# Patient Record
Sex: FEMALE | Race: BLACK OR AFRICAN AMERICAN | URBAN - METROPOLITAN AREA
[De-identification: names, ages, dates, MRNs, and addresses within clinical notes are randomized per-mention and may not be internally consistent; named-entity substitution may affect disease eponyms.]

---

## 2020-09-29 ENCOUNTER — OFFICE VISIT (OUTPATIENT)
Dept: OBGYN CLINIC | Age: 20
End: 2020-09-29
Payer: COMMERCIAL

## 2020-09-29 ENCOUNTER — HOSPITAL ENCOUNTER (OUTPATIENT)
Age: 20
Setting detail: SPECIMEN
Discharge: HOME | End: 2020-09-29
Payer: COMMERCIAL

## 2020-09-29 VITALS
HEART RATE: 92 BPM | BODY MASS INDEX: 43.94 KG/M2 | WEIGHT: 248 LBS | HEIGHT: 63 IN | SYSTOLIC BLOOD PRESSURE: 129 MMHG | TEMPERATURE: 98.1 F | DIASTOLIC BLOOD PRESSURE: 90 MMHG

## 2020-09-29 PROCEDURE — 99385 PREV VISIT NEW AGE 18-39: CPT | Performed by: ADVANCED PRACTICE MIDWIFE

## 2020-09-29 SDOH — ECONOMIC STABILITY: FOOD INSECURITY: WITHIN THE PAST 12 MONTHS, YOU WORRIED THAT YOUR FOOD WOULD RUN OUT BEFORE YOU GOT MONEY TO BUY MORE.: NEVER TRUE

## 2020-09-29 SDOH — ECONOMIC STABILITY: INCOME INSECURITY: HOW HARD IS IT FOR YOU TO PAY FOR THE VERY BASICS LIKE FOOD, HOUSING, MEDICAL CARE, AND HEATING?: SOMEWHAT HARD

## 2020-09-29 SDOH — ECONOMIC STABILITY: TRANSPORTATION INSECURITY
IN THE PAST 12 MONTHS, HAS LACK OF TRANSPORTATION KEPT YOU FROM MEETINGS, WORK, OR FROM GETTING THINGS NEEDED FOR DAILY LIVING?: NO

## 2020-09-29 SDOH — ECONOMIC STABILITY: FOOD INSECURITY: WITHIN THE PAST 12 MONTHS, THE FOOD YOU BOUGHT JUST DIDN'T LAST AND YOU DIDN'T HAVE MONEY TO GET MORE.: SOMETIMES TRUE

## 2020-09-29 SDOH — ECONOMIC STABILITY: TRANSPORTATION INSECURITY
IN THE PAST 12 MONTHS, HAS THE LACK OF TRANSPORTATION KEPT YOU FROM MEDICAL APPOINTMENTS OR FROM GETTING MEDICATIONS?: NO

## 2020-09-29 ASSESSMENT — ENCOUNTER SYMPTOMS
ALLERGIC/IMMUNOLOGIC NEGATIVE: 1
EYES NEGATIVE: 1
GASTROINTESTINAL NEGATIVE: 1
RESPIRATORY NEGATIVE: 1

## 2020-09-29 NOTE — PROGRESS NOTES
Date of Visit:  2020  Patient :  2000     Subjective:     CHIEF COMPLAINT:     Chief Complaint   Patient presents with    Annual Exam     DUE FOR PAP       HPI  New patient. Here for annual visit. Her bowel habits are regular. She denies any bloating. She denies dysuria. She denies urinary leaking. Some itching and a little odor over the last few weeks. [facility] She denies vaginal discharge. She is sexually active with single partner, contraception - OCP (estrogen/progesterone). 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 Desires STD screening. Is on COCPs for last 3 years would like to continue. States she has refills. Depression  2 question Screen:  Over the past two weeks, has the patient felt down,depressed or hopeless? No  Over the past two weeks, has the patient felt little interest or pleasure in doing things? No    PREVENTIVE HEALTH SCREENING:   Date of last pap: n/a                 HPV typing/date :n/a    Abnormal pap smear history: no    Date of last mammogram:  neg   Date of last DEXA scan: n/a  Date of last colonoscopy: n/a    History of Gestational Diabetes: No    Preventive screening: No    Family history of Breast, Ovarian, Colon or Uterine Cancer:  neg    Objective:   GYNECOLOGIC HISTORY:     LMP:  Patient's last menstrual period was 2020 (exact date).   Monthly menses (days): 28  Length: 5  Flow: moderate, light    Menopause: n/a  Hormone Replacement: no    Sexually active: Yes  HPV vaccine: unsure    STD history: No    Birth control method :Yes  Permanent Sterilization: No    SOCIALHISTORY:  Seat Belt Use: Yes  Domestic Violence: No    Counseling: No  Regular exercise: No   Counseling:No     Diet discussed: Yes    Social History     Tobacco Use   Smoking Status Never Smoker   Smokeless Tobacco Never Used     Social effort. Cardiovascular: The heart was in a regular rhythm and rate was normal.  No murmur or extra sounds were noted. Breast:  The breasts are normal size and symmetrical.  There are no skin changes with position changes. The nipples are without deviations or discharge. No masses were palpated. No axillary or supraclavicular lymphadenopathy is present. Back:  Straight with no CVA tenderness present    Abdomen: The abdomen is soft and non-tender. There was no guarding, rebound or rigidity. The bladder was without fullness or tenderness. No hernias were appreciated. Pelvic: The external genitalia has a normal appearance without masses or lesions. There is no inguinal lymphadenopathy. Bladder/urethra without discharge or mass. Normal urethra. The vagina is pink and well rugated. The cervix is without lesions or discharge and with no CMT. + friable cervix. Pap was obtained without difficulty. The uterus is midline, mobile, normal size/shape and non-tender. The adnexa are without masses or tenderness. Rectum is normal.    Musculoskeletal: Normal gait. No contractions with normal movement of all extremities. No cyanosis or edema present    Psychiatric:  Alert, oriented to time, place, person and situation. There are no mood or affect changes. ASSESSMENT/PLAN:     Routine annual gynecological exam  .1. Women's annual routine gynecological examination  - PAP SMEAR; Future  -Discussed contraception. Is on COCPs can call office for refills. 2. Vaginal itching  - VAGINITIS DNA PROBE; Future  -Discussed boric acid twice a week for 3 months for suppression    3. STD exposure  - C.trachomatis N.gonorrhoeae DNA, Thin Prep; Future  - T. Pallidum Ab; Future  - HIV Screen; Future        Counseling Completed:    discussed need for repeat pap as per American Society for Colposcopy and Cervical Pathology guidelines.   discussed need for mammograms every 1 year, If >44 yo and last mammogram was negative. discussed birth control and barrier recommendations. discussed STD counseling and prevention. discussed Gardisil counseling for all patients 9-25 yo, pt to find out if she received. Hereditary Breast, Ovarian, Colon and Uterine Cancer screening discussed. Tobacco & Secondary smoke risks discussed; with recommendation for cessation and avoidance. Routine health maintenance per patients PCP discussed. Reviewed how to find PCP        Patient was seen with total face to face time of 25 minutes. More than 50% of this visit was counseling and education regarding    Diagnosis Orders   1. Women's annual routine gynecological examination  PAP SMEAR   2. Vaginal itching  VAGINITIS DNA PROBE   3. STD exposure  C.trachomatis N.gonorrhoeae DNA, Thin Prep    T.  Pallidum Ab    HIV Screen       Electronically signed by Adriana Zurita on 9/29/20 at 2:24 PM EDT

## 2020-09-30 LAB
DIRECT EXAM: ABNORMAL
Lab: ABNORMAL
SPECIMEN DESCRIPTION: ABNORMAL

## 2020-10-01 ENCOUNTER — TELEPHONE (OUTPATIENT)
Dept: OBGYN CLINIC | Age: 20
End: 2020-10-01

## 2020-10-01 LAB
CHLAMYDIA BY THIN PREP: NEGATIVE
N. GONORRHOEAE DNA, THIN PREP: NEGATIVE
SPECIMEN DESCRIPTION: NORMAL

## 2020-10-01 RX ORDER — CLINDAMYCIN HYDROCHLORIDE 300 MG/1
300 CAPSULE ORAL 2 TIMES DAILY
Qty: 14 CAPSULE | Refills: 0 | Status: SHIPPED | OUTPATIENT
Start: 2020-10-01 | End: 2020-10-08

## 2020-10-01 NOTE — TELEPHONE ENCOUNTER
Patient notified of results. Asked about other labs that are in the process. I let her know that we would call her when they were completed.

## 2020-10-08 LAB — CYTOLOGY REPORT: NORMAL

## 2020-10-16 LAB
HPV SOURCE: NORMAL
HPV, GENOTYPE 16: NOT DETECTED
HPV, GENOTYPE 18: NOT DETECTED
HPV, HIGH RISK OTHER: DETECTED

## 2020-10-19 ENCOUNTER — TELEPHONE (OUTPATIENT)
Dept: OBGYN CLINIC | Age: 20
End: 2020-10-19

## 2020-10-19 PROBLEM — R87.610 ATYPICAL SQUAMOUS CELLS OF UNDETERMINED SIGNIFICANCE ON CYTOLOGIC SMEAR OF CERVIX (ASC-US): Status: ACTIVE | Noted: 2020-10-19

## 2020-10-19 NOTE — TELEPHONE ENCOUNTER
Called to discuss pap results. Message was left for patient to call office. +ASCUS and +HPV  Recommended repeat pap with cotesting for HPV in 2021.  Will attempt again to contact patient

## 2020-10-20 ENCOUNTER — TELEPHONE (OUTPATIENT)
Dept: PEDIATRICS CLINIC | Age: 20
End: 2020-10-20

## 2020-10-22 ENCOUNTER — TELEPHONE (OUTPATIENT)
Dept: OBGYN CLINIC | Age: 20
End: 2020-10-22

## 2020-10-22 NOTE — TELEPHONE ENCOUNTER
Spoke w/ pt regarding completing outstanding blood work. States she will try to get in next Tuesday and complete.

## 2021-02-05 ENCOUNTER — OFFICE VISIT (OUTPATIENT)
Dept: OBGYN CLINIC | Age: 21
End: 2021-02-05
Payer: COMMERCIAL

## 2021-02-05 ENCOUNTER — HOSPITAL ENCOUNTER (OUTPATIENT)
Dept: ULTRASOUND IMAGING | Facility: CLINIC | Age: 21
Discharge: HOME | End: 2021-02-07
Payer: COMMERCIAL

## 2021-02-05 ENCOUNTER — PATIENT MESSAGE (OUTPATIENT)
Dept: OBGYN CLINIC | Age: 21
End: 2021-02-05

## 2021-02-05 VITALS
HEIGHT: 65 IN | HEART RATE: 101 BPM | DIASTOLIC BLOOD PRESSURE: 87 MMHG | BODY MASS INDEX: 43.4 KG/M2 | TEMPERATURE: 97.3 F | SYSTOLIC BLOOD PRESSURE: 136 MMHG | WEIGHT: 260.5 LBS

## 2021-02-05 DIAGNOSIS — N93.9 ABNORMAL UTERINE BLEEDING (AUB): Primary | ICD-10-CM

## 2021-02-05 DIAGNOSIS — N76.0 BV (BACTERIAL VAGINOSIS): Primary | ICD-10-CM

## 2021-02-05 DIAGNOSIS — N93.9 ABNORMAL UTERINE BLEEDING (AUB): ICD-10-CM

## 2021-02-05 DIAGNOSIS — B96.89 BV (BACTERIAL VAGINOSIS): Primary | ICD-10-CM

## 2021-02-05 PROCEDURE — G8427 DOCREV CUR MEDS BY ELIG CLIN: HCPCS | Performed by: ADVANCED PRACTICE MIDWIFE

## 2021-02-05 PROCEDURE — G8417 CALC BMI ABV UP PARAM F/U: HCPCS | Performed by: ADVANCED PRACTICE MIDWIFE

## 2021-02-05 PROCEDURE — 99213 OFFICE O/P EST LOW 20 MIN: CPT | Performed by: ADVANCED PRACTICE MIDWIFE

## 2021-02-05 PROCEDURE — G8484 FLU IMMUNIZE NO ADMIN: HCPCS | Performed by: ADVANCED PRACTICE MIDWIFE

## 2021-02-05 PROCEDURE — 1036F TOBACCO NON-USER: CPT | Performed by: ADVANCED PRACTICE MIDWIFE

## 2021-02-05 PROCEDURE — 76856 US EXAM PELVIC COMPLETE: CPT

## 2021-02-05 ASSESSMENT — PATIENT HEALTH QUESTIONNAIRE - PHQ9
SUM OF ALL RESPONSES TO PHQ9 QUESTIONS 1 & 2: 0
SUM OF ALL RESPONSES TO PHQ QUESTIONS 1-9: 0
SUM OF ALL RESPONSES TO PHQ QUESTIONS 1-9: 0

## 2021-02-05 ASSESSMENT — ENCOUNTER SYMPTOMS
ALLERGIC/IMMUNOLOGIC NEGATIVE: 1
RESPIRATORY NEGATIVE: 1
GASTROINTESTINAL NEGATIVE: 1

## 2021-02-05 NOTE — PROGRESS NOTES
SUBJECTIVE:    Chief Complaint:  Chief Complaint   Patient presents with    Vaginal Bleeding     had normal period 1/13-1/18 and has been bleeding since about 1/28       CHIEF COMPLAINT:     Chief Complaint   Patient presents with    Vaginal Bleeding     had normal period 1/13-1/18 and has been bleeding since about 1/28       HPI:   Gina Garvin presents today complaining of AUB. She reports she has been consistently on same birth control pills for several years without issues. This issue started this month after her withdrawal bleed. She then started bleeding again on the 28 and was pretty heavy. She has no other associated symptoms. She has done pregnancy tests, which have been negative. States low probability of pregnancy. She states she has not started any new medications, herbals, etc.  She has not experienced any changes to her health either. Gina Garvin also reports she has not missed/skipped any pills    GYNECOLOGIC HISTORY:   Patient's last menstrual period was 01/13/2021 (exact date). Last pap: 2020 [facility]    Sexually active: Yes     STD history: No    Birth control method : OCP      REVIEW OF SYSTEMS:  Review of Systems   Constitutional: Negative. HENT: Negative. Respiratory: Negative. Cardiovascular: Negative. Gastrointestinal: Negative. Genitourinary: Positive for menstrual problem. Musculoskeletal: Negative. Skin: Negative. Allergic/Immunologic: Negative. Psychiatric/Behavioral: Negative. PHYSICAL EXAM:  Constitutional:   Blood pressure 136/87, pulse 101, temperature 97.3 °F (36.3 °C), height 5' 5\" (1.651 m), weight 260 lb 8 oz (118.2 kg), last menstrual period 01/13/2021. Wt Readings from Last 3 Encounters:   02/05/21 260 lb 8 oz (118.2 kg)   09/29/20 248 lb (112.5 kg)   06/28/19 227 lb (103 kg)     General Appearance:   This is a well-developed, well-nourishedand well-groomed female    Skin:  Inspection of the skin revealed no rashes or lesions    Pelvic:  Deferred Psychiatric:  Alert, oriented to time, place, person and situation. There are no mood or affect changes. ASSESSMENT/PLAN:  1. Abnormal uterine bleeding (AUB)  - Discussed etiologies of AUB/BTB on OCP. Discussed labs in relation to pill-free week but unsure what they would contribute to diagnosis. She has no other associated symptoms. Labs declined  - Discussed US for possible cyst but may also not be visible  - Discussed continue to take her OCP per her normal, expect a bleed next week and start new pack, may self-resolve afterwards  - Discussed changing and/or increasing hormonal component of OPC if persistent bleeding  - Discussed weight gain of 12 lb since September and increase in Estrogen and effect on hormone levels; Is attempting to diet and exercise. - Reviewed needed to return for pap follow-up in September  - US NON OB TRANSVAGINAL; Future  - US PELVIS COMPLETE; Future      The patient, Rosanne Rivas,  was seen for the visit on this date of service by the [facility]  The time component, involved both face-to-face (counseling and education)  and non face-to-face time (care coordination), spent in determining the total time component.

## 2021-02-26 ENCOUNTER — OFFICE VISIT (OUTPATIENT)
Dept: OBGYN CLINIC | Age: 21
End: 2021-02-26
Payer: COMMERCIAL

## 2021-02-26 ENCOUNTER — HOSPITAL ENCOUNTER (OUTPATIENT)
Age: 21
Setting detail: SPECIMEN
Discharge: HOME | End: 2021-02-26
Payer: COMMERCIAL

## 2021-02-26 VITALS
BODY MASS INDEX: 41.74 KG/M2 | HEIGHT: 66 IN | DIASTOLIC BLOOD PRESSURE: 82 MMHG | TEMPERATURE: 97.1 F | WEIGHT: 259.7 LBS | SYSTOLIC BLOOD PRESSURE: 127 MMHG | HEART RATE: 97 BPM

## 2021-02-26 DIAGNOSIS — Z11.3 SCREENING FOR STD (SEXUALLY TRANSMITTED DISEASE): ICD-10-CM

## 2021-02-26 DIAGNOSIS — N89.8 VAGINAL ITCHING: Primary | ICD-10-CM

## 2021-02-26 DIAGNOSIS — N89.8 VAGINAL ITCHING: ICD-10-CM

## 2021-02-26 PROCEDURE — G8417 CALC BMI ABV UP PARAM F/U: HCPCS | Performed by: ADVANCED PRACTICE MIDWIFE

## 2021-02-26 PROCEDURE — 36415 COLL VENOUS BLD VENIPUNCTURE: CPT | Performed by: ADVANCED PRACTICE MIDWIFE

## 2021-02-26 PROCEDURE — G8427 DOCREV CUR MEDS BY ELIG CLIN: HCPCS | Performed by: ADVANCED PRACTICE MIDWIFE

## 2021-02-26 PROCEDURE — G8484 FLU IMMUNIZE NO ADMIN: HCPCS | Performed by: ADVANCED PRACTICE MIDWIFE

## 2021-02-26 PROCEDURE — 99213 OFFICE O/P EST LOW 20 MIN: CPT | Performed by: ADVANCED PRACTICE MIDWIFE

## 2021-02-26 PROCEDURE — 1036F TOBACCO NON-USER: CPT | Performed by: ADVANCED PRACTICE MIDWIFE

## 2021-02-26 RX ORDER — FLUCONAZOLE 150 MG/1
150 TABLET ORAL ONCE
Qty: 1 TABLET | Refills: 0 | Status: SHIPPED | OUTPATIENT
Start: 2021-02-26 | End: 2021-02-26

## 2021-02-26 ASSESSMENT — ENCOUNTER SYMPTOMS
GASTROINTESTINAL NEGATIVE: 1
RESPIRATORY NEGATIVE: 1

## 2021-02-26 NOTE — PROGRESS NOTES
Performed venipuncture of the _right_ arm. Pt tolerated _well_. Blood Flow obtained obtained succussfully___.   Collected 2_ -SST _ -Lav

## 2021-02-26 NOTE — PROGRESS NOTES
CHIEF COMPLAINT:     Chief Complaint   Patient presents with    Medication Refill    Vaginal Itching       HPI:   Tam Kim presents today complaining of vaginal itching. She states she has no vaginal discharge, but notices a slight odor. She reports this started approximately a week ago and started once her period ended. She has tried boric acid vaginal suppositories and cleaning with sensitive, unscented Dove soap which has not helped. Jason Jeronimo feels that nothing she has tried is making it better. She is frustrated with recurrent BV and having to deal with it      GYNECOLOGIC HISTORY:     Menopause: NA  Patient's last menstrual period was 01/28/2021 (exact date). Last pap: 9/2020  ASCUS    Sexually active: Yes  New sexual partner: No    STD history: No    Birth control method :Yes      REVIEW OF SYSTEMS:  Review of Systems   Constitutional: Negative. Respiratory: Negative. Cardiovascular: Negative. Gastrointestinal: Negative. Genitourinary:        Vaginal itching   Psychiatric/Behavioral: Negative. PHYSICAL EXAM:  Constitutional:   Blood pressure 127/82, pulse 97, temperature 97.1 °F (36.2 °C), height 5' 6\" (1.676 m), weight 259 lb 11.2 oz (117.8 kg), last menstrual period 01/28/2021, not currently breastfeeding. Wt Readings from Last 3 Encounters:   02/26/21 259 lb 11.2 oz (117.8 kg)   02/05/21 260 lb 8 oz (118.2 kg)   09/29/20 248 lb (112.5 kg)         General Appearance: This is a well-developed, well-nourishedand well-groomed female    Breast:  Deferred     Pelvic:  Deferred    Psychiatric:  Alert, oriented to time, place, person and situation. There are no mood or affect changes. ASSESSMENT/PLAN:  1. Vaginal itching  - Vaginitis DNA Probe; Future   Discussed with patient cleansing her vagina with water and no soaps. Importance of keeping bottom dry and wearing cotton underwear.    Will treat with Clindamycin and encouraged probiotic use Will send in Diflucan to treat over the weekend possible yeast.  Will send in 6 month prescription for prophylactic treatment of her recurrent BV. 2. Screening for STD    - Vaginitis DNA Probe; Future  - C.trachomatis N.gonorrhoeae DNA, Urine; Future  - Hepatitis A Antibody, Total; Future  - Hepatitis B Surface Antigen; Future  - Hepatitis C Antibody; Future  - T. pallidum Ab; Future  - HIV Screen; Future  - [place] COLLECTION VENOUS BLOOD,VENIPUNCTURE      The patient, Rosanne Rivas,  was seen with a total time spent of 20 minutes for the visit on this date of service by the [facility]  The time component, involved both face-to-face (counseling and education)  and non face-to-face time (care coordination), spent in determining the total time component.

## 2021-02-27 LAB
DIRECT EXAM: ABNORMAL
HAV AB SERPL IA-ACNC: NONREACTIVE
HEPATITIS B SURFACE ANTIGEN: NONREACTIVE
HEPATITIS C ANTIBODY: NONREACTIVE
HIV AG/AB: NONREACTIVE
Lab: ABNORMAL
SPECIMEN DESCRIPTION: ABNORMAL
T. PALLIDUM, IGG: NONREACTIVE

## 2021-03-01 LAB
C. TRACHOMATIS DNA ,URINE: NEGATIVE
N. GONORRHOEAE DNA, URINE: NEGATIVE
SPECIMEN DESCRIPTION: NORMAL

## 2021-03-01 NOTE — TELEPHONE ENCOUNTER
From: ZURI Minor CNM  To: Lucho Tillman  Sent: 2/5/2021 1:59 PM EST  Subject: ultrasound    Osman Huggins,  Just reviewed your ultrasound. Everything looks good  The good news is there is no cyst, the bad news, this isn't helping us (you) with the bleeding. Lets see how it goes with your next package of pills.   Let me know if the bleeding is not going away and at that point, we may want to revisit another birth control pill as we discussed  Any questions, let me know,  connie

## 2021-03-08 ENCOUNTER — TELEPHONE (OUTPATIENT)
Dept: OBGYN CLINIC | Age: 21
End: 2021-03-08

## 2021-03-08 RX ORDER — NORGESTIMATE AND ETHINYL ESTRADIOL 7DAYSX3 28
1 KIT ORAL DAILY
Qty: 28 TABLET | Refills: 11 | Status: SHIPPED | OUTPATIENT
Start: 2021-03-08 | End: 2021-05-24 | Stop reason: ALTCHOICE

## 2021-04-13 ENCOUNTER — OFFICE VISIT (OUTPATIENT)
Dept: OBGYN CLINIC | Age: 21
End: 2021-04-13
Payer: COMMERCIAL

## 2021-04-13 ENCOUNTER — PATIENT MESSAGE (OUTPATIENT)
Dept: OBGYN CLINIC | Age: 21
End: 2021-04-13

## 2021-04-13 VITALS
HEART RATE: 83 BPM | BODY MASS INDEX: 41.48 KG/M2 | HEIGHT: 66 IN | WEIGHT: 258.1 LBS | DIASTOLIC BLOOD PRESSURE: 86 MMHG | TEMPERATURE: 98.2 F | SYSTOLIC BLOOD PRESSURE: 126 MMHG

## 2021-04-13 DIAGNOSIS — N63.20 LEFT BREAST LUMP: Primary | ICD-10-CM

## 2021-04-13 PROCEDURE — 1036F TOBACCO NON-USER: CPT | Performed by: ADVANCED PRACTICE MIDWIFE

## 2021-04-13 PROCEDURE — G8417 CALC BMI ABV UP PARAM F/U: HCPCS | Performed by: ADVANCED PRACTICE MIDWIFE

## 2021-04-13 PROCEDURE — G8427 DOCREV CUR MEDS BY ELIG CLIN: HCPCS | Performed by: ADVANCED PRACTICE MIDWIFE

## 2021-04-13 PROCEDURE — 99213 OFFICE O/P EST LOW 20 MIN: CPT | Performed by: ADVANCED PRACTICE MIDWIFE

## 2021-04-13 ASSESSMENT — ENCOUNTER SYMPTOMS
RESPIRATORY NEGATIVE: 1
GASTROINTESTINAL NEGATIVE: 1

## 2021-04-13 NOTE — TELEPHONE ENCOUNTER
From: Jacqueline Queen  To: ZURI Lyons CNM  Sent: 4/13/2021 3:05 PM EDT  Subject: Visit Follow-Up Question    Hello, just wanted to let you know that I was unable to get an ultrasound appointment until April 26th.

## 2021-04-13 NOTE — PROGRESS NOTES
present. The area is hot to touch and tender. There is a flocculent mass palpated, approximately 4-5 cm in size. Psychiatric:  Alert, oriented to time, place, person and situation. There are no mood or affect changes. ASSESSMENT/PLAN:  1. Left breast lump  - Discussed findings with Mariano, unsure of etiology, possible mastitis/abscess/cyst?  She will continue with antibiotics given. Call if fever and/or worsening symptoms  - Await breast US results; discussed referral to Dr Sean Levy for I&D as needed  - Reviewed Urgent Care visit notes  - US BREAST COMPLETE LEFT; Future      The patient, Adelaida Huntley,  was seen with a total time spent of 20 minutes for the visit on this date of service by the [facility]  The time component, involved both face-to-face (counseling and education)  and non face-to-face time (care coordination), spent in determining the total time component.

## 2021-04-21 ENCOUNTER — HOSPITAL ENCOUNTER (OUTPATIENT)
Dept: WOMENS IMAGING | Age: 21
End: 2021-04-21
Payer: COMMERCIAL

## 2021-04-21 ENCOUNTER — HOSPITAL ENCOUNTER (OUTPATIENT)
Dept: WOMENS IMAGING | Age: 21
Discharge: HOME | End: 2021-04-23
Payer: COMMERCIAL

## 2021-04-21 DIAGNOSIS — N63.20 LEFT BREAST LUMP: ICD-10-CM

## 2021-04-21 PROCEDURE — 76642 ULTRASOUND BREAST LIMITED: CPT

## 2021-04-22 ENCOUNTER — TELEPHONE (OUTPATIENT)
Dept: OBGYN CLINIC | Age: 21
End: 2021-04-22

## 2021-05-24 ENCOUNTER — OFFICE VISIT (OUTPATIENT)
Dept: OBGYN CLINIC | Age: 21
End: 2021-05-24
Payer: COMMERCIAL

## 2021-05-24 VITALS
HEART RATE: 82 BPM | SYSTOLIC BLOOD PRESSURE: 125 MMHG | HEIGHT: 66 IN | BODY MASS INDEX: 42.11 KG/M2 | DIASTOLIC BLOOD PRESSURE: 82 MMHG | WEIGHT: 262 LBS

## 2021-05-24 DIAGNOSIS — N92.1 BREAKTHROUGH BLEEDING ON BIRTH CONTROL PILLS: Primary | ICD-10-CM

## 2021-05-24 PROCEDURE — 99213 OFFICE O/P EST LOW 20 MIN: CPT | Performed by: ADVANCED PRACTICE MIDWIFE

## 2021-05-24 PROCEDURE — G8417 CALC BMI ABV UP PARAM F/U: HCPCS | Performed by: ADVANCED PRACTICE MIDWIFE

## 2021-05-24 PROCEDURE — G8427 DOCREV CUR MEDS BY ELIG CLIN: HCPCS | Performed by: ADVANCED PRACTICE MIDWIFE

## 2021-05-24 PROCEDURE — 1036F TOBACCO NON-USER: CPT | Performed by: ADVANCED PRACTICE MIDWIFE

## 2021-05-24 RX ORDER — NORGESTIMATE AND ETHINYL ESTRADIOL 0.25-0.035
1 KIT ORAL DAILY
Qty: 1 PACKET | Refills: 3 | Status: SHIPPED | OUTPATIENT
Start: 2021-05-24 | End: 2021-08-26

## 2021-05-24 ASSESSMENT — ENCOUNTER SYMPTOMS
RESPIRATORY NEGATIVE: 1
GASTROINTESTINAL NEGATIVE: 1

## 2021-05-24 NOTE — PROGRESS NOTES
CHIEF COMPLAINT:     Chief Complaint   Patient presents with    Vaginal Bleeding     bleeding continuously on ocp       HPI:   Jason Morris presents today complaining of BTB on her birth control pill. She has used this in the past without any issues and is unsure what has happened. She denies any new prescriptions or drugs. No changes to her health. Taking as usual.     Jason Morris reports bleeding starts a week or two before her inactive pills and then bleeds during that time frame. She does report that she has decided to wait on her breast at this time      GYNECOLOGIC HISTORY:   Patient's last menstrual period was 05/17/2021 (approximate). Last pap: 9/2020    STD history: No    Birth control method : OCP    REVIEW OF SYSTEMS:  Review of Systems   Constitutional: Negative. HENT: Negative. Respiratory: Negative. Cardiovascular: Negative. Gastrointestinal: Negative. Genitourinary: Positive for menstrual problem. Musculoskeletal: Negative. Skin: Negative. Neurological: Negative. Psychiatric/Behavioral: Negative. PHYSICAL EXAM:  Constitutional:   Blood pressure 125/82, pulse 82, height 5' 6\" (1.676 m), weight 262 lb (118.8 kg), last menstrual period 05/17/2021, not currently breastfeeding. Wt Readings from Last 3 Encounters:   05/24/21 262 lb (118.8 kg)   04/13/21 258 lb 1.6 oz (117.1 kg)   02/26/21 259 lb 11.2 oz (117.8 kg)         General Appearance: This is a well-developed, well-nourishedand well-groomed female    Skin:  Inspection of the skin revealed no rashes or lesions    Pelvic:  Deferred    Psychiatric:  Alert, oriented to time, place, person and situation. There are no mood or affect changes. ASSESSMENT/PLAN:  1. Breakthrough bleeding on birth control pills  - Will change OCP to monophasic and evaluate for next 1-3 months. If continued BTB, will change progestin. - norgestimate-ethinyl estradiol (ORTHO-CYCLEN, 28,) 0.25-35 MG-MCG per tablet;  Take 1 tablet by mouth daily  Dispense: 1 packet; Refill: 3      The patient, Jazmine Davenport,  was seen with a total time spent of 20 minutes for the visit on this date of service by the [facility]  The time component, involved both face-to-face (counseling and education)  and non face-to-face time (care coordination), spent in determining the total time component.

## 2021-08-09 ENCOUNTER — TELEPHONE (OUTPATIENT)
Dept: OBGYN CLINIC | Age: 21
End: 2021-08-09

## 2021-08-09 NOTE — TELEPHONE ENCOUNTER
Patient states she is having vaginal irritation. Patient states this has been happening after her cycles. She is scheduled on 08/09/2021 with Jean Pierre Yang.

## 2021-08-11 ENCOUNTER — OFFICE VISIT (OUTPATIENT)
Dept: OBGYN CLINIC | Age: 21
End: 2021-08-11
Payer: COMMERCIAL

## 2021-08-11 ENCOUNTER — HOSPITAL ENCOUNTER (OUTPATIENT)
Age: 21
Setting detail: SPECIMEN
Discharge: HOME | End: 2021-08-11
Payer: COMMERCIAL

## 2021-08-11 VITALS
DIASTOLIC BLOOD PRESSURE: 85 MMHG | BODY MASS INDEX: 43.07 KG/M2 | SYSTOLIC BLOOD PRESSURE: 132 MMHG | HEART RATE: 89 BPM | HEIGHT: 66 IN | WEIGHT: 268 LBS

## 2021-08-11 DIAGNOSIS — N89.8 VAGINAL ITCHING: Primary | ICD-10-CM

## 2021-08-11 DIAGNOSIS — N89.8 VAGINAL ITCHING: ICD-10-CM

## 2021-08-11 LAB
DIRECT EXAM: ABNORMAL
Lab: ABNORMAL
SPECIMEN DESCRIPTION: ABNORMAL

## 2021-08-11 PROCEDURE — G8427 DOCREV CUR MEDS BY ELIG CLIN: HCPCS | Performed by: ADVANCED PRACTICE MIDWIFE

## 2021-08-11 PROCEDURE — 99213 OFFICE O/P EST LOW 20 MIN: CPT | Performed by: ADVANCED PRACTICE MIDWIFE

## 2021-08-11 PROCEDURE — 1036F TOBACCO NON-USER: CPT | Performed by: ADVANCED PRACTICE MIDWIFE

## 2021-08-11 PROCEDURE — G8417 CALC BMI ABV UP PARAM F/U: HCPCS | Performed by: ADVANCED PRACTICE MIDWIFE

## 2021-08-11 NOTE — PROGRESS NOTES
Rajni Ruiz (:  2000) is a 24 y.o. female,Established patient, here for evaluation of the following chief complaint(s):  Vaginal Itching         ASSESSMENT/PLAN:  1. Vaginal itching  -     Vaginitis DNA Probe; Future    Prefers Metrogel if BV. Return if symptoms worsen or fail to improve. Subjective   SUBJECTIVE/OBJECTIVE:  Diego Ying is here today c/o vaginal itching. No discharge. No odor. Has been coming and going last month. New OCP is working much better for for her. Review of Systems   Genitourinary:        Vaginal itching          Objective   Physical Exam  Genitourinary:     Vagina: Erythema present. An electronic signature was used to authenticate this note.     --ZURI Mccarthy CNM

## 2021-08-12 ENCOUNTER — TELEPHONE (OUTPATIENT)
Dept: OBGYN CLINIC | Age: 21
End: 2021-08-12

## 2021-08-12 DIAGNOSIS — N76.0 BV (BACTERIAL VAGINOSIS): Primary | ICD-10-CM

## 2021-08-12 DIAGNOSIS — B96.89 BV (BACTERIAL VAGINOSIS): Primary | ICD-10-CM

## 2021-08-12 RX ORDER — METRONIDAZOLE 500 MG/1
500 TABLET ORAL 2 TIMES DAILY
Qty: 14 TABLET | Refills: 0 | Status: SHIPPED | OUTPATIENT
Start: 2021-08-12 | End: 2021-08-19

## 2021-08-19 ENCOUNTER — OFFICE VISIT (OUTPATIENT)
Dept: OBGYN CLINIC | Age: 21
End: 2021-08-19
Payer: COMMERCIAL

## 2021-08-19 ENCOUNTER — HOSPITAL ENCOUNTER (OUTPATIENT)
Age: 21
Setting detail: SPECIMEN
Discharge: HOME | End: 2021-08-19
Payer: COMMERCIAL

## 2021-08-19 VITALS
WEIGHT: 267.7 LBS | BODY MASS INDEX: 43.02 KG/M2 | HEART RATE: 85 BPM | DIASTOLIC BLOOD PRESSURE: 78 MMHG | HEIGHT: 66 IN | SYSTOLIC BLOOD PRESSURE: 121 MMHG

## 2021-08-19 DIAGNOSIS — N94.9 GENITAL LESION, FEMALE: ICD-10-CM

## 2021-08-19 DIAGNOSIS — R10.2 VAGINAL PAIN: ICD-10-CM

## 2021-08-19 DIAGNOSIS — R10.2 VAGINAL PAIN: Primary | ICD-10-CM

## 2021-08-19 DIAGNOSIS — Z20.2 POSSIBLE EXPOSURE TO STD: ICD-10-CM

## 2021-08-19 PROCEDURE — 1036F TOBACCO NON-USER: CPT | Performed by: ADVANCED PRACTICE MIDWIFE

## 2021-08-19 PROCEDURE — G8427 DOCREV CUR MEDS BY ELIG CLIN: HCPCS | Performed by: ADVANCED PRACTICE MIDWIFE

## 2021-08-19 PROCEDURE — 99213 OFFICE O/P EST LOW 20 MIN: CPT | Performed by: ADVANCED PRACTICE MIDWIFE

## 2021-08-19 PROCEDURE — G8417 CALC BMI ABV UP PARAM F/U: HCPCS | Performed by: ADVANCED PRACTICE MIDWIFE

## 2021-08-19 NOTE — PROGRESS NOTES
Gary Galdamez (:  2000) is a 24 y.o. female,Established patient, here for evaluation of the following chief complaint(s):  Follow-up (vaginal pain meds not working )         ASSESSMENT/PLAN:  1. Vaginal pain  -     VAGINITIS DNA PROBE; Future  2. Genital lesion, female  -     Herpes Simplex 1 & 2, Molecular; Future  -     Culture, Genital; Future  3. Possible exposure to STD  -     Herpes Simplex 1 & 2, Molecular; Future  -     Chlamydia Trachomatis & Neisseria gonorrhoeae (GC) by amplified detection; Future        SUBJECTIVE/OBJECTIVE:  Rajan Ybarra presents to the office for concern for vaginal pain. States it started / and around the 6th or 7th was even more painful. Denies any lesions. States pain is mostly at the opening of her vagina. She just finished flagyl for BV. Currently stats she does not feel her symptpms have fully resolved. Patient's last menstrual period was 2021 (exact date). Review of Systems   Constitutional: Negative. HENT: Negative. Eyes: Negative. Respiratory: Negative. Gastrointestinal: Negative. Endocrine: Negative. Genitourinary: Positive for vaginal discharge and vaginal pain. Vaginal odor   Musculoskeletal: Negative. Skin: Negative. Allergic/Immunologic: Negative. Neurological: Negative. Hematological: Negative. Psychiatric/Behavioral: Negative. Physical Exam  Constitutional:       Appearance: Normal appearance. Cardiovascular:      Rate and Rhythm: Normal rate. Pulses: Normal pulses. Pulmonary:      Effort: Pulmonary effort is normal.   Abdominal:      General: Abdomen is flat. Palpations: Abdomen is soft. Genitourinary:     Cervix: Normal.      Uterus: Normal.       Adnexa: Right adnexa normal and left adnexa normal.          Comments: Chaperone for Intimate Exam  Chaperone was offered as part of the rooming process.  Patient declined and agrees to continue with exam without a chaperone. Musculoskeletal:         General: Normal range of motion. Skin:     General: Skin is warm and dry. Neurological:      General: No focal deficit present. Mental Status: She is alert. Psychiatric:         Mood and Affect: Mood normal.         Behavior: Behavior normal.         Thought Content: Thought content normal.         Judgment: Judgment normal.              An electronic signature was used to authenticate this note.     --ZURI Veras CNM

## 2021-08-20 ENCOUNTER — TELEPHONE (OUTPATIENT)
Dept: OBGYN CLINIC | Age: 21
End: 2021-08-20

## 2021-08-20 DIAGNOSIS — T36.95XA ANTIBIOTIC-INDUCED YEAST INFECTION: ICD-10-CM

## 2021-08-20 DIAGNOSIS — B37.9 ANTIBIOTIC-INDUCED YEAST INFECTION: ICD-10-CM

## 2021-08-20 DIAGNOSIS — A74.9 CHLAMYDIA: Primary | ICD-10-CM

## 2021-08-20 DIAGNOSIS — N76.0 BV (BACTERIAL VAGINOSIS): ICD-10-CM

## 2021-08-20 DIAGNOSIS — B96.89 BV (BACTERIAL VAGINOSIS): ICD-10-CM

## 2021-08-20 LAB
C TRACH DNA GENITAL QL NAA+PROBE: ABNORMAL
DIRECT EXAM: ABNORMAL
HSV 1, NAAT: NEGATIVE
HSV 2, NAAT: NEGATIVE
Lab: ABNORMAL
N. GONORRHOEAE DNA: NEGATIVE
SPECIMEN DESCRIPTION: ABNORMAL
SPECIMEN DESCRIPTION: ABNORMAL
SPECIMEN DESCRIPTION: NORMAL

## 2021-08-20 RX ORDER — AZITHROMYCIN 500 MG/1
1000 TABLET, FILM COATED ORAL ONCE
Qty: 2 TABLET | Refills: 0 | Status: SHIPPED | OUTPATIENT
Start: 2021-08-20 | End: 2021-08-20

## 2021-08-20 RX ORDER — METRONIDAZOLE 7.5 MG/G
GEL VAGINAL
Qty: 1 TUBE | Refills: 0 | Status: SHIPPED | OUTPATIENT
Start: 2021-08-20 | End: 2021-08-27

## 2021-08-20 RX ORDER — FLUCONAZOLE 150 MG/1
150 TABLET ORAL ONCE
Qty: 1 TABLET | Refills: 0 | Status: SHIPPED | OUTPATIENT
Start: 2021-08-20 | End: 2021-08-20

## 2021-08-20 ASSESSMENT — ENCOUNTER SYMPTOMS
GASTROINTESTINAL NEGATIVE: 1
RESPIRATORY NEGATIVE: 1
EYES NEGATIVE: 1
ALLERGIC/IMMUNOLOGIC NEGATIVE: 1

## 2021-08-20 NOTE — TELEPHONE ENCOUNTER
Pt called and I was able to review +BV and chlamydia. Antibiotics sent to pharmacy. Pt requests expedited partner therapy. Suleman Live  4/29/1996  [phone]    Pt RTC in 3 months.

## 2021-08-22 LAB
CULTURE: ABNORMAL
Lab: ABNORMAL
SPECIMEN DESCRIPTION: ABNORMAL

## 2021-08-25 ENCOUNTER — TELEPHONE (OUTPATIENT)
Dept: OBGYN CLINIC | Age: 21
End: 2021-08-25

## 2021-08-25 NOTE — TELEPHONE ENCOUNTER
Call and discussed pts genital culture results. GBS positive discussed will not treat since not pregnant, pt has diflucan for yeast. Pt will RTC in 3 weeks for NATHALY.  Pt did not have any other questions

## 2021-08-26 DIAGNOSIS — N92.1 BREAKTHROUGH BLEEDING ON BIRTH CONTROL PILLS: ICD-10-CM

## 2021-08-26 RX ORDER — NORGESTIMATE AND ETHINYL ESTRADIOL 0.25-0.035
KIT ORAL
Qty: 28 TABLET | Refills: 3 | Status: SHIPPED | OUTPATIENT
Start: 2021-08-26 | End: 2021-11-24 | Stop reason: SDUPTHER

## 2021-08-26 NOTE — TELEPHONE ENCOUNTER
Requested Prescriptions     Pending Prescriptions Disp Refills    [facility] 0.25-35 [facility]-MCG per tablet [Pharmacy Med Name: [place] 0.25 mg-35 mcg tablet] 28 tablet 3     Sig: TAKE ONE TABLET BY MOUTH DAILY       Audrey Lo is requesting a refill   Last Annual visit:  09/29/2020  Last OB visit:NA   Next OB/Office visit:  10/13/2021  Last prescribing provider:  Alicia Polanco

## 2021-09-29 ENCOUNTER — HOSPITAL ENCOUNTER (OUTPATIENT)
Age: 21
Setting detail: SPECIMEN
Discharge: HOME | End: 2021-09-29
Payer: COMMERCIAL

## 2021-09-29 ENCOUNTER — OFFICE VISIT (OUTPATIENT)
Dept: OBGYN CLINIC | Age: 21
End: 2021-09-29
Payer: COMMERCIAL

## 2021-09-29 VITALS
WEIGHT: 265.8 LBS | DIASTOLIC BLOOD PRESSURE: 76 MMHG | SYSTOLIC BLOOD PRESSURE: 121 MMHG | HEIGHT: 66 IN | BODY MASS INDEX: 42.72 KG/M2

## 2021-09-29 DIAGNOSIS — N64.4 BREAST PAIN, RIGHT: Primary | ICD-10-CM

## 2021-09-29 DIAGNOSIS — N89.8 VAGINAL IRRITATION: ICD-10-CM

## 2021-09-29 DIAGNOSIS — Z11.3 SCREENING EXAMINATION FOR STD (SEXUALLY TRANSMITTED DISEASE): ICD-10-CM

## 2021-09-29 PROCEDURE — 99213 OFFICE O/P EST LOW 20 MIN: CPT | Performed by: ADVANCED PRACTICE MIDWIFE

## 2021-09-29 PROCEDURE — G8417 CALC BMI ABV UP PARAM F/U: HCPCS | Performed by: ADVANCED PRACTICE MIDWIFE

## 2021-09-29 PROCEDURE — G8427 DOCREV CUR MEDS BY ELIG CLIN: HCPCS | Performed by: ADVANCED PRACTICE MIDWIFE

## 2021-09-29 PROCEDURE — 1036F TOBACCO NON-USER: CPT | Performed by: ADVANCED PRACTICE MIDWIFE

## 2021-09-29 NOTE — PROGRESS NOTES
Minh Bradley (:  2000) is a 24 y.o. female,Established patient, here for evaluation of the following chief complaint(s):  Breast Problem (rt breast )         ASSESSMENT/PLAN:  1. Vaginal irritation  -     Vaginitis DNA Probe; Future  -     Chlamydia Trachomatis & Neisseria gonorrhoeae (GC) by amplified detection; Future  2. Breast pain, right  -     US BREAST LIMITED RIGHT; Future  3. Screening examination for STD (sexually transmitted disease)  -     Chlamydia Trachomatis & Neisseria gonorrhoeae (GC) by amplified detection; Future        -Declines HIV, tpal and hep screening      Discussed needs repeat pap  pt agreeable and will schedule annual    SUBJECTIVE/OBJECTIVE:  Amita Duvall presents for concerns for right breast pain. States the pain just started over a week ago. Feels mostly on the right outer quadrant. Denies feeling any masses. Denies changes in breast appearance or nipple discharge. Also reports increase in vaginal irritation. Denies discharge or odor at this time. Would like GC/CH screening. Review of Systems   Constitutional: Negative. HENT: Negative. Eyes: Negative. Respiratory: Negative. Gastrointestinal: Negative. Endocrine: Negative. Genitourinary: Negative. Musculoskeletal:        Right breast pain   Allergic/Immunologic: Negative. Neurological: Negative. Hematological: Negative. Psychiatric/Behavioral: Negative. Physical Exam  HENT:      Head: Normocephalic. Pulmonary:      Effort: Pulmonary effort is normal.   Chest:      Breasts:         Right: Normal. No swelling, bleeding, mass, nipple discharge, skin change or tenderness. Left: No swelling, bleeding, mass, nipple discharge, skin change or tenderness. Comments: No masses palpated. Breast exam WNL. Slight reproducible discomfort in top right outer quad   Abdominal:      General: Abdomen is flat. Palpations: Abdomen is soft.    Genitourinary:     General:

## 2021-09-30 ENCOUNTER — TELEPHONE (OUTPATIENT)
Dept: OBGYN CLINIC | Age: 21
End: 2021-09-30

## 2021-09-30 DIAGNOSIS — B96.89 BV (BACTERIAL VAGINOSIS): Primary | ICD-10-CM

## 2021-09-30 DIAGNOSIS — N89.8 VAGINAL IRRITATION: ICD-10-CM

## 2021-09-30 DIAGNOSIS — N76.0 BV (BACTERIAL VAGINOSIS): Primary | ICD-10-CM

## 2021-09-30 DIAGNOSIS — N76.0 RECURRENT VAGINITIS: ICD-10-CM

## 2021-09-30 LAB
C TRACH DNA GENITAL QL NAA+PROBE: NEGATIVE
DIRECT EXAM: ABNORMAL
Lab: ABNORMAL
N. GONORRHOEAE DNA: NEGATIVE
SPECIMEN DESCRIPTION: ABNORMAL
SPECIMEN DESCRIPTION: NORMAL

## 2021-09-30 RX ORDER — SECNIDAZOLE 2 G/4.8G
1 GRANULE ORAL ONCE
Qty: 1 EACH | Refills: 0 | Status: SHIPPED | OUTPATIENT
Start: 2021-09-30 | End: 2021-09-30

## 2021-11-24 ENCOUNTER — OFFICE VISIT (OUTPATIENT)
Dept: OBGYN CLINIC | Age: 21
End: 2021-11-24
Payer: COMMERCIAL

## 2021-11-24 ENCOUNTER — HOSPITAL ENCOUNTER (OUTPATIENT)
Age: 21
Setting detail: SPECIMEN
Discharge: HOME | End: 2021-11-24

## 2021-11-24 VITALS
HEART RATE: 83 BPM | WEIGHT: 261 LBS | BODY MASS INDEX: 43.49 KG/M2 | SYSTOLIC BLOOD PRESSURE: 128 MMHG | DIASTOLIC BLOOD PRESSURE: 79 MMHG | HEIGHT: 65 IN

## 2021-11-24 DIAGNOSIS — N92.1 BREAKTHROUGH BLEEDING ON BIRTH CONTROL PILLS: ICD-10-CM

## 2021-11-24 DIAGNOSIS — Z01.419 WOMEN'S ANNUAL ROUTINE GYNECOLOGICAL EXAMINATION: Primary | ICD-10-CM

## 2021-11-24 DIAGNOSIS — Z20.2 STD EXPOSURE: ICD-10-CM

## 2021-11-24 DIAGNOSIS — Z30.41 ENCOUNTER FOR SURVEILLANCE OF CONTRACEPTIVE PILLS: ICD-10-CM

## 2021-11-24 DIAGNOSIS — N94.6 DYSMENORRHEA: ICD-10-CM

## 2021-11-24 DIAGNOSIS — N61.1 BREAST ABSCESS: ICD-10-CM

## 2021-11-24 PROCEDURE — G8484 FLU IMMUNIZE NO ADMIN: HCPCS | Performed by: ADVANCED PRACTICE MIDWIFE

## 2021-11-24 PROCEDURE — 99395 PREV VISIT EST AGE 18-39: CPT | Performed by: ADVANCED PRACTICE MIDWIFE

## 2021-11-24 RX ORDER — NORGESTIMATE AND ETHINYL ESTRADIOL 0.25-0.035
1 KIT ORAL DAILY
Qty: 84 TABLET | Refills: 5 | Status: SHIPPED | OUTPATIENT
Start: 2021-11-24

## 2021-11-24 SDOH — ECONOMIC STABILITY: FOOD INSECURITY: WITHIN THE PAST 12 MONTHS, THE FOOD YOU BOUGHT JUST DIDN'T LAST AND YOU DIDN'T HAVE MONEY TO GET MORE.: NEVER TRUE

## 2021-11-24 SDOH — ECONOMIC STABILITY: FOOD INSECURITY: WITHIN THE PAST 12 MONTHS, YOU WORRIED THAT YOUR FOOD WOULD RUN OUT BEFORE YOU GOT MONEY TO BUY MORE.: NEVER TRUE

## 2021-11-24 ASSESSMENT — SOCIAL DETERMINANTS OF HEALTH (SDOH): HOW HARD IS IT FOR YOU TO PAY FOR THE VERY BASICS LIKE FOOD, HOUSING, MEDICAL CARE, AND HEATING?: NOT HARD AT ALL

## 2021-11-24 NOTE — PROGRESS NOTES
Date of Visit:  2021  Patient :  2000     Subjective:     CHIEF COMPLAINT:     Chief Complaint   Patient presents with    Gynecologic Exam     last pap 20       HPI    Her bowel habits are regular. She denies any bloating. She denies dysuria. She denies urinary leaking. She denies vaginal discharge. She is sexually active with single partner, contraception - OCP (estrogen/progesterone). Reports some BTB on COCPs. She would like to continue. Depression  2 question Screen:  Over the past two weeks, has the patient felt down,depressed or hopeless? No  Over the past two weeks, has the patient felt little interest or pleasure in doing things? No    PREVENTIVE HEALTH SCREENING:   Date of last pap: 2020 ASCUS                  HPV typing/date :HPV   positive  Abnormal pap smear history: yes    Date of last mammogram: n/a   Date of last DEXA scan: n/a  Date of last colonoscopy: n/a    History of Gestational Diabetes: No    Preventive screening: Yes with PCP     Family history of Breast, Ovarian, Colon or Uterine Cancer:  Yes maternal grandmother     If Yes see scanned worksheet    Objective:   GYNECOLOGIC HISTORY:   Menarche: 11    LMP:  Patient's last menstrual period was 2021 (exact date).   Monthly menses (days): 28  Length: 3-4  Flow:moderate to  light      Sexually active: Yes  HPV vaccine: yes    STD history: Yes, chlamydia     Birth control method :Yes  Permanent Sterilization: No    SOCIALHISTORY:  Seat Belt Use: Yes  Domestic Violence: No    Counseling: No  Regular exercise: Yes   Counseling:Yes     Diet discussed: Yes    Social History     Tobacco Use   Smoking Status Never Smoker   Smokeless Tobacco Never Used     Social History     Substance and Sexual Activity   Alcohol Use Yes    Comment: socially      Social History     Substance and Sexual Activity   Drug Use Not Currently    Types: Marijuana (Weed)    Comment: socially       Current Outpatient Medications Medication Sig Dispense Refill    norgestimate-ethinyl estradiol (MONO-LINYAH) 0.25-35 MG-MCG per tablet Take 1 tablet by mouth daily Take 1 tab by mouth daily, skip placebo week and start new pack for continuous cycle 84 tablet 5     No current facility-administered medications for this visit. REVIEW OF SYSTEMS:  Review of Systems   Constitutional: Negative. HENT: Negative. Eyes: Negative. Respiratory: Negative. Cardiovascular: Negative. Gastrointestinal: Negative. Endocrine: Negative. Genitourinary: Positive for menstrual problem. Musculoskeletal: Negative. Skin: Negative. Allergic/Immunologic: Negative. Neurological: Negative. Hematological: Negative. Psychiatric/Behavioral: Negative. Physical Exam:     Constitutional:   Blood pressure 128/79, pulse 83, height 5' 5\" (1.651 m), weight 261 lb (118.4 kg), last menstrual period 11/17/2021, not currently breastfeeding. Wt Readings from Last 3 Encounters:   11/24/21 261 lb (118.4 kg)   09/29/21 265 lb 12.8 oz (120.6 kg)   08/19/21 267 lb 11.2 oz (121.4 kg)       General Appearance: This is a well-developed, well-nourished and well-groomed female    Skin:  Inspection of the skin revealed no rashes or lesions    Neck and EENT:  No eye discharge and sclera non-icteric  Lips, teeth and gums without lesions and normal dentition  Nares are patent without discharge  Normal external ears are present with no hearing loss  The neck was supple with full range of motion and no masses. The thyroid was not enlarged and had no masses. No enlarged cervical lymph nodes    Respiratory: The lungs were clear to auscultation bilaterally. There were no rales, rhonchi or wheezes. There was good respiratory effort. Cardiovascular: The heart was in a regular rhythm and rate was normal.  No murmur or extra sounds were noted. Breast:  The breasts are normal size and symmetrical.  There are no skin changes with position changes. The nipples are without deviations or discharge. No masses were palpated. No axillary or supraclavicular lymphadenopathy is present. Back:  Straight with no CVA tenderness present    Abdomen: The abdomen is soft and non-tender. There was no guarding, rebound or rigidity. The bladder was without fullness or tenderness. No hernias were appreciated. Pelvic: The external genitalia has a normal appearance without masses or lesions. There is no inguinal lymphadenopathy. Bladder/urethra without discharge or mass. Normal urethra. The vagina is pink and well rugated. The cervix is without lesions or discharge and with no CMT. Pap was obtained without difficulty. The uterus is midline, mobile, normal size/shape and non-tender. The adnexa are without masses or tenderness. Rectum is normal.    Musculoskeletal: Normal gait. No contractions with normal movement of all extremities. No cyanosis or edema present    Psychiatric:  Alert, oriented to time, place, person and situation. There are no mood or affect changes. ASSESSMENT/PLAN:     Routine annual gynecological exam  .1. Women's annual routine gynecological examination  - PAP Smear; Future    2. Breast abscess  -f/u from previous visits. Symptoms resolved. Recommended US follow up   - [facility]; Future    3. Breakthrough bleeding on birth control pills  -BTB has improved she would like to do continuous cycle   - norgestimate-ethinyl estradiol (MONO-LINYAH) 0.25-35 MG-MCG per tablet; Take 1 tablet by mouth daily Take 1 tab by mouth daily, skip placebo week and start new pack for continuous cycle  Dispense: 84 tablet; Refill: 5    4. Encounter for surveillance of contraceptive pills  - norgestimate-ethinyl estradiol (MONO-LINYAH) 0.25-35 MG-MCG per tablet; Take 1 tablet by mouth daily Take 1 tab by mouth daily, skip placebo week and start new pack for continuous cycle  Dispense: 84 tablet; Refill: 5    5.  Dysmenorrhea  - norgestimate-ethinyl estradiol (MONO-LINYAH) 0.25-35 MG-MCG per tablet; Take 1 tablet by mouth daily Take 1 tab by mouth daily, skip placebo week and start new pack for continuous cycle  Dispense: 84 tablet; Refill: 5  -ACHES warnings reviewed    6. STD exposure  - Chlamydia/GC DNA, Thin Prep; Future  -declines HIV/tpal/ hep screening       Counseling Completed:    discussed need for repeat pap as per American Society for Colposcopy and Cervical Pathology guidelines. discussed need for colonoscopy screening as well as onset for bone density testing. discussed birth control and barrier recommendations. discussed STD counseling and prevention. discussed Gardisil counseling for all patients 9-25 yo. Hereditary Breast, Ovarian, Colon and Uterine Cancer screening discussed. Tobacco & Secondary smoke risks discussed; with recommendation for cessation and avoidance. Routine health maintenance per patients PCP discussed. Patient was seen with total face to face time of 25 minutes. More than 50% of this visit was counseling and education regarding    Diagnosis Orders   1. Women's annual routine gynecological examination  PAP Smear   2. Breast abscess  US BREAST COMPLETE LEFT   3. Breakthrough bleeding on birth control pills  norgestimate-ethinyl estradiol (MONO-LINYAH) 0.25-35 MG-MCG per tablet   4. Encounter for surveillance of contraceptive pills  norgestimate-ethinyl estradiol (MONO-LINYAH) 0.25-35 MG-MCG per tablet   5. Dysmenorrhea  norgestimate-ethinyl estradiol (MONO-LINYAH) 0.25-35 MG-MCG per tablet   6.  STD exposure  Chlamydia/GC DNA, Thin Prep       Electronically signed by Genia Sample, Adriana Choudhury on 11/24/21 at 3:00 PM EST

## 2021-11-25 DIAGNOSIS — Z20.2 STD EXPOSURE: ICD-10-CM

## 2021-11-29 ASSESSMENT — ENCOUNTER SYMPTOMS
ALLERGIC/IMMUNOLOGIC NEGATIVE: 1
EYES NEGATIVE: 1
RESPIRATORY NEGATIVE: 1
GASTROINTESTINAL NEGATIVE: 1

## 2021-12-03 ENCOUNTER — TELEPHONE (OUTPATIENT)
Dept: OBGYN CLINIC | Age: 21
End: 2021-12-03

## 2021-12-03 LAB — CYTOLOGY REPORT: NORMAL

## 2022-01-08 ENCOUNTER — HOSPITAL ENCOUNTER (OUTPATIENT)
Dept: GENERAL RADIOLOGY | Age: 22
Discharge: HOME | End: 2022-01-10

## 2022-01-08 ENCOUNTER — HOSPITAL ENCOUNTER (OUTPATIENT)
Age: 22
Discharge: HOME | End: 2022-01-08

## 2022-01-08 DIAGNOSIS — Z00.00 PHYSICAL EXAM: ICD-10-CM

## 2022-01-08 LAB
-: ABNORMAL
ABSOLUTE EOS #: 0.25 K/UL (ref 0–0.44)
ABSOLUTE IMMATURE GRANULOCYTE: 0.03 K/UL (ref 0–0.3)
ABSOLUTE LYMPH #: 2.26 K/UL (ref 1.1–3.7)
ABSOLUTE MONO #: 0.58 K/UL (ref 0.1–1.2)
ALBUMIN SERPL-MCNC: 3.8 G/DL (ref 3.5–5.2)
ALBUMIN/GLOBULIN RATIO: 1.1 (ref 1–2.5)
ALP BLD-CCNC: 72 U/L (ref 35–104)
ALT SERPL-CCNC: 14 U/L (ref 5–33)
AMORPHOUS: ABNORMAL
ANION GAP SERPL CALCULATED.3IONS-SCNC: 9 MMOL/L (ref 9–17)
AST SERPL-CCNC: 17 U/L
BACTERIA: ABNORMAL
BASOPHILS # BLD: 0 % (ref 0–2)
BASOPHILS ABSOLUTE: 0.03 K/UL (ref 0–0.2)
BILIRUB SERPL-MCNC: 0.46 MG/DL (ref 0.3–1.2)
BILIRUBIN URINE: NEGATIVE
BUN BLDV-MCNC: 8 MG/DL (ref 6–20)
BUN/CREAT BLD: ABNORMAL (ref 9–20)
CALCIUM SERPL-MCNC: 8.8 MG/DL (ref 8.6–10.4)
CASTS UA: ABNORMAL /LPF (ref 0–8)
CHLORIDE BLD-SCNC: 103 MMOL/L (ref 98–107)
CHOLESTEROL/HDL RATIO: 2.1
CHOLESTEROL: 135 MG/DL
CO2: 23 MMOL/L (ref 20–31)
COLOR: YELLOW
CREAT SERPL-MCNC: 0.63 MG/DL (ref 0.5–0.9)
CRYSTALS, UA: ABNORMAL /HPF
DIFFERENTIAL TYPE: ABNORMAL
EOSINOPHILS RELATIVE PERCENT: 3 % (ref 1–4)
EPITHELIAL CELLS UA: ABNORMAL /HPF (ref 0–5)
GFR AFRICAN AMERICAN: >60 ML/MIN
GFR NON-AFRICAN AMERICAN: >60 ML/MIN
GFR SERPL CREATININE-BSD FRML MDRD: ABNORMAL ML/MIN/{1.73_M2}
GFR SERPL CREATININE-BSD FRML MDRD: ABNORMAL ML/MIN/{1.73_M2}
GLOBULIN: ABNORMAL G/DL (ref 1.5–3.8)
GLUCOSE BLD-MCNC: 89 MG/DL (ref 70–99)
GLUCOSE URINE: NEGATIVE
HCT VFR BLD CALC: 40.8 % (ref 36.3–47.1)
HDLC SERPL-MCNC: 63 MG/DL
HEMOGLOBIN: 14 G/DL (ref 11.9–15.1)
IMMATURE GRANULOCYTES: 0 %
IRON: 151 UG/DL (ref 37–145)
KETONES, URINE: NEGATIVE
LACTATE DEHYDROGENASE: 259 U/L (ref 135–214)
LDL CHOLESTEROL: 59 MG/DL (ref 0–130)
LEUKOCYTE ESTERASE, URINE: ABNORMAL
LYMPHOCYTES # BLD: 22 % (ref 24–43)
MCH RBC QN AUTO: 30.1 PG (ref 25.2–33.5)
MCHC RBC AUTO-ENTMCNC: 34.3 G/DL (ref 28.4–34.8)
MCV RBC AUTO: 87.7 FL (ref 82.6–102.9)
MONOCYTES # BLD: 6 % (ref 3–12)
MUCUS: ABNORMAL
NITRITE, URINE: NEGATIVE
NRBC AUTOMATED: 0 PER 100 WBC
OTHER OBSERVATIONS UA: ABNORMAL
PDW BLD-RTO: 13.5 % (ref 11.8–14.4)
PH UA: 6.5 (ref 5–8)
PHOSPHORUS: 3.2 MG/DL (ref 2.6–4.5)
PLATELET # BLD: 278 K/UL (ref 138–453)
PLATELET ESTIMATE: ABNORMAL
PMV BLD AUTO: 11.6 FL (ref 8.1–13.5)
POTASSIUM SERPL-SCNC: 3.9 MMOL/L (ref 3.7–5.3)
PROTEIN UA: NEGATIVE
RBC # BLD: 4.65 M/UL (ref 3.95–5.11)
RBC # BLD: ABNORMAL 10*6/UL
RBC UA: ABNORMAL /HPF (ref 0–4)
RENAL EPITHELIAL, UA: ABNORMAL /HPF
SEG NEUTROPHILS: 69 % (ref 36–65)
SEGMENTED NEUTROPHILS ABSOLUTE COUNT: 7.02 K/UL (ref 1.5–8.1)
SODIUM BLD-SCNC: 135 MMOL/L (ref 135–144)
SPECIFIC GRAVITY UA: 1.01 (ref 1–1.03)
TOTAL PROTEIN: 7.4 G/DL (ref 6.4–8.3)
TRICHOMONAS: ABNORMAL
TRIGL SERPL-MCNC: 64 MG/DL
TURBIDITY: CLEAR
URIC ACID: 4.8 MG/DL (ref 2.4–5.7)
URINE HGB: NEGATIVE
UROBILINOGEN, URINE: NORMAL
VLDLC SERPL CALC-MCNC: ABNORMAL MG/DL (ref 1–30)
WBC # BLD: 10.2 K/UL (ref 3.5–11.3)
WBC # BLD: ABNORMAL 10*3/UL
WBC UA: ABNORMAL /HPF (ref 0–5)
YEAST: ABNORMAL

## 2022-01-08 PROCEDURE — 81001 URINALYSIS AUTO W/SCOPE: CPT

## 2022-01-08 PROCEDURE — 80061 LIPID PANEL: CPT

## 2022-01-08 PROCEDURE — 83615 LACTATE (LD) (LDH) ENZYME: CPT

## 2022-01-08 PROCEDURE — 84100 ASSAY OF PHOSPHORUS: CPT

## 2022-01-08 PROCEDURE — 83540 ASSAY OF IRON: CPT

## 2022-01-08 PROCEDURE — 85025 COMPLETE CBC W/AUTO DIFF WBC: CPT

## 2022-01-08 PROCEDURE — 84550 ASSAY OF BLOOD/URIC ACID: CPT

## 2022-01-08 PROCEDURE — 86480 TB TEST CELL IMMUN MEASURE: CPT

## 2022-01-08 PROCEDURE — 71045 X-RAY EXAM CHEST 1 VIEW: CPT

## 2022-01-08 PROCEDURE — 80053 COMPREHEN METABOLIC PANEL: CPT

## 2022-01-10 LAB
QUANTI TB GOLD PLUS: NEGATIVE
QUANTI TB1 MINUS NIL: 0 IU/ML (ref 0–0.34)
QUANTI TB2 MINUS NIL: 0 IU/ML (ref 0–0.34)
QUANTIFERON MITOGEN: 9.7 IU/ML
QUANTIFERON NIL: 0.02 IU/ML

## 2022-06-13 ENCOUNTER — HOSPITAL ENCOUNTER (OUTPATIENT)
Age: 22
Setting detail: SPECIMEN
Discharge: HOME | End: 2022-06-13

## 2022-06-13 ENCOUNTER — OFFICE VISIT (OUTPATIENT)
Dept: OBGYN CLINIC | Age: 22
End: 2022-06-13
Payer: COMMERCIAL

## 2022-06-13 VITALS
DIASTOLIC BLOOD PRESSURE: 89 MMHG | HEIGHT: 65 IN | HEART RATE: 100 BPM | SYSTOLIC BLOOD PRESSURE: 135 MMHG | BODY MASS INDEX: 38.67 KG/M2 | WEIGHT: 232.1 LBS

## 2022-06-13 DIAGNOSIS — Z20.2 POSSIBLE EXPOSURE TO STD: ICD-10-CM

## 2022-06-13 DIAGNOSIS — N89.8 VAGINAL ODOR: Primary | ICD-10-CM

## 2022-06-13 DIAGNOSIS — N89.8 VAGINAL ODOR: ICD-10-CM

## 2022-06-13 PROCEDURE — G8417 CALC BMI ABV UP PARAM F/U: HCPCS | Performed by: ADVANCED PRACTICE MIDWIFE

## 2022-06-13 PROCEDURE — 1036F TOBACCO NON-USER: CPT | Performed by: ADVANCED PRACTICE MIDWIFE

## 2022-06-13 PROCEDURE — G8427 DOCREV CUR MEDS BY ELIG CLIN: HCPCS | Performed by: ADVANCED PRACTICE MIDWIFE

## 2022-06-13 PROCEDURE — 99213 OFFICE O/P EST LOW 20 MIN: CPT | Performed by: ADVANCED PRACTICE MIDWIFE

## 2022-06-13 NOTE — PROGRESS NOTES
Idalmis Fermin (:  2000) is a 20 y.o. female,Established patient, here for evaluation of the following chief complaint(s):  Vaginal Odor and Exposure to STD         ASSESSMENT/PLAN:  1. Possible exposure to STD  -     C.trachomatis N.gonorrhoeae DNA, Urine; Future  2. Vaginal odor  -     Vaginitis DNA Probe; Future      No follow-ups on file. Subjective   SUBJECTIVE/OBJECTIVE:  Akash Almeida is here today for STD check and c/o vaginal odor. She feels it is BV and she has tried all of her self remedies. Review of Systems   Genitourinary:        Vaginal odor          Objective   Physical Exam    /89   Pulse 100   Ht 5' 5\" (1.651 m)   Wt 232 lb 1.6 oz (105.3 kg)   LMP 2022 (Exact Date)   BMI 38.62 kg/m²     Vag culture obtained  Urine spec obtained. On this date 2022 I have spent 20 minutes reviewing previous notes, test results and face to face with the patient discussing the diagnosis and importance of compliance with the treatment plan as well as documenting on the day of the visit. An electronic signature was used to authenticate this note.     --Baldomero Boast, APRN - CNM

## 2022-06-14 LAB
C. TRACHOMATIS DNA ,URINE: NEGATIVE
CANDIDA SPECIES, DNA PROBE: NEGATIVE
GARDNERELLA VAGINALIS, DNA PROBE: POSITIVE
N. GONORRHOEAE DNA, URINE: NEGATIVE
SOURCE: ABNORMAL
SPECIMEN DESCRIPTION: NORMAL
TRICHOMONAS VAGINALIS DNA: NEGATIVE

## 2022-06-15 ENCOUNTER — TELEPHONE (OUTPATIENT)
Dept: OBGYN CLINIC | Age: 22
End: 2022-06-15

## 2022-06-15 RX ORDER — METRONIDAZOLE 7.5 MG/G
GEL VAGINAL
Qty: 1 EACH | Refills: 0 | Status: SHIPPED | OUTPATIENT
Start: 2022-06-15 | End: 2022-06-22

## 2022-06-15 NOTE — TELEPHONE ENCOUNTER
Pt did not see mychart message from provider about test results but called office.  Pt was informed of results and stated that she prefers vaginal treatment, not oral.

## 2022-08-11 ENCOUNTER — OFFICE VISIT (OUTPATIENT)
Dept: OBGYN CLINIC | Age: 22
End: 2022-08-11
Payer: COMMERCIAL

## 2022-08-11 ENCOUNTER — HOSPITAL ENCOUNTER (OUTPATIENT)
Age: 22
Setting detail: SPECIMEN
Discharge: HOME | End: 2022-08-11

## 2022-08-11 VITALS
DIASTOLIC BLOOD PRESSURE: 78 MMHG | HEIGHT: 65 IN | WEIGHT: 234 LBS | SYSTOLIC BLOOD PRESSURE: 110 MMHG | BODY MASS INDEX: 38.99 KG/M2

## 2022-08-11 DIAGNOSIS — L72.3 SEBACEOUS CYST: Primary | ICD-10-CM

## 2022-08-11 DIAGNOSIS — Z11.3 SCREENING FOR STD (SEXUALLY TRANSMITTED DISEASE): ICD-10-CM

## 2022-08-11 PROBLEM — N89.8 VAGINAL ITCHING: Status: RESOLVED | Noted: 2021-08-11 | Resolved: 2022-08-11

## 2022-08-11 PROCEDURE — G8427 DOCREV CUR MEDS BY ELIG CLIN: HCPCS | Performed by: ADVANCED PRACTICE MIDWIFE

## 2022-08-11 PROCEDURE — 1036F TOBACCO NON-USER: CPT | Performed by: ADVANCED PRACTICE MIDWIFE

## 2022-08-11 PROCEDURE — 99213 OFFICE O/P EST LOW 20 MIN: CPT | Performed by: ADVANCED PRACTICE MIDWIFE

## 2022-08-11 PROCEDURE — G8417 CALC BMI ABV UP PARAM F/U: HCPCS | Performed by: ADVANCED PRACTICE MIDWIFE

## 2022-08-11 ASSESSMENT — ENCOUNTER SYMPTOMS
GASTROINTESTINAL NEGATIVE: 1
RESPIRATORY NEGATIVE: 1

## 2022-08-11 NOTE — PROGRESS NOTES
CHIEF COMPLAINT:     Chief Complaint   Patient presents with    Other     Vaginal lump           HPI:   Krystin Garcia presents today complaining of finding vaginal lumps. She reports she noticed this about a week ago, when one appeared on her left side. It was not painful, never drained, etc. She used nothing on it and it self-resolved. Krystin Garcia now reports another bump, but on the opposite side. She feels this is slightly larger than her previous but again has no associated pain, drainage, etc.  Has not applied anything to make it better  She is with same sexual partner but is open to [facility] testing as needed. Has had recently  Reports consistent waxing    GYNECOLOGIC HISTORY:   Patient's last menstrual period was 07/21/2022. Last pap: 11/2021    Sexually active: Yes  New sexual partner: No    STD history: Yes, hx of Chlamydia    Birth control method :Yes OCP      REVIEW OF SYSTEMS:  Review of Systems   Constitutional: Negative. HENT: Negative. Respiratory: Negative. Cardiovascular: Negative. Gastrointestinal: Negative. Genitourinary:         Vaginal bumps   Musculoskeletal: Negative. Neurological: Negative. Psychiatric/Behavioral: Negative. PHYSICAL EXAM:  Constitutional:   Blood pressure 110/78, height 5' 5\" (1.651 m), weight 234 lb (106.1 kg), last menstrual period 07/21/2022, not currently breastfeeding. Wt Readings from Last 3 Encounters:   08/11/22 234 lb (106.1 kg)   06/13/22 232 lb 1.6 oz (105.3 kg)   11/24/21 261 lb (118.4 kg)       General Appearance: Appears healthy. Alert; in no acute distress. Pleasant. Skin: Skin color, texture, turgor normal. No rashes or lesions. Lymphatic: No abnormally enlarged lymph nodes. Pelvic Exam:   Sterile Speculum Exam:   External genitalia: Hair has been removed, normal appearing vulva, tenderness or lesions, normal clitoris, normal urethral meatus. No lesions noted on left side.   Closer to the crural fold on the right side, is a papule. No drainage, not-tender to palpation, suspect sebaceous cyst  Musculoskeletal: No joint swelling, deformity, or tenderness. , no gross abnormalities. Extremities: Non-tender BLE and non-edematous. Psych: Oriented to time, place and person, mood and affect are within normal limits. ASSESSMENT/PLAN:  1. Sebaceous cyst  - Discussed suspected sebaceous cyst; advised may be related to waxing. She can apply warm compresses as needed, no squeezing    2. Screening for STD (sexually transmitted disease)  - await results and treat as needed  - Chlamydia/GC DNA, Urine; Future  - Vaginitis DNA Probe; Future  - Hepatitis B Surface Antibody; Future  - HIV Screen; Future  - T. pallidum Ab; Future      The patient, Angella Jin,  was seen with a total time spent of 20 minutes for the visit on this date of service by the [facility]  The time component, involved both face-to-face (counseling and education)  and non face-to-face time (care coordination), spent in determining the total time component.

## 2022-08-12 DIAGNOSIS — N76.0 RECURRENT VAGINITIS: Primary | ICD-10-CM

## 2022-08-12 LAB
C. TRACHOMATIS DNA ,URINE: NEGATIVE
CANDIDA SPECIES, DNA PROBE: NEGATIVE
GARDNERELLA VAGINALIS, DNA PROBE: POSITIVE
HBV SURFACE AB TITR SER: 102.9 MIU/ML
HIV AG/AB: NONREACTIVE
N. GONORRHOEAE DNA, URINE: NEGATIVE
SOURCE: ABNORMAL
SPECIMEN DESCRIPTION: NORMAL
T. PALLIDUM, IGG: NONREACTIVE
TRICHOMONAS VAGINALIS DNA: NEGATIVE

## 2022-08-12 RX ORDER — METRONIDAZOLE 7.5 MG/G
GEL VAGINAL
Qty: 1 EACH | Refills: 1 | Status: SHIPPED | OUTPATIENT
Start: 2022-08-12 | End: 2022-08-19

## 2022-11-18 NOTE — TELEPHONE ENCOUNTER
Called and reviewed breast US findings with Mariano. Abscess vs cyst and whether she would consider I&D. She is undecided at this time. Is feeling somewhat better, took 6 days of antibiotic only. Current pain is 4-5 compared to 8-9 at visit. Is more sore now due to manipulation at [address]. She does not want recurrence but also unsure regarding intervention.   Desires time to consider options and will call back if desires referral for draining The patient is a 64y Female complaining of dizziness.

## 2023-03-30 ENCOUNTER — TELEPHONE (OUTPATIENT)
Dept: OBGYN CLINIC | Age: 23
End: 2023-03-30

## 2023-03-30 NOTE — TELEPHONE ENCOUNTER
Patient called and c/o abnormal bleeding with her ocp's and wants a midwife to call her. Patient has annual exam scheduled on 4/25/23.  Thanks

## 2023-04-25 ENCOUNTER — OFFICE VISIT (OUTPATIENT)
Dept: OBGYN CLINIC | Age: 23
End: 2023-04-25
Payer: COMMERCIAL

## 2023-04-25 VITALS
SYSTOLIC BLOOD PRESSURE: 120 MMHG | DIASTOLIC BLOOD PRESSURE: 80 MMHG | HEIGHT: 65 IN | WEIGHT: 230 LBS | BODY MASS INDEX: 38.32 KG/M2

## 2023-04-25 DIAGNOSIS — Z11.51 SCREENING FOR HUMAN PAPILLOMAVIRUS: ICD-10-CM

## 2023-04-25 DIAGNOSIS — Z30.41 ENCOUNTER FOR BIRTH CONTROL PILLS MAINTENANCE: ICD-10-CM

## 2023-04-25 DIAGNOSIS — Z30.41 ENCOUNTER FOR SURVEILLANCE OF CONTRACEPTIVE PILLS: ICD-10-CM

## 2023-04-25 DIAGNOSIS — N94.6 DYSMENORRHEA: ICD-10-CM

## 2023-04-25 DIAGNOSIS — N92.1 BREAKTHROUGH BLEEDING ON BIRTH CONTROL PILLS: ICD-10-CM

## 2023-04-25 DIAGNOSIS — Z01.419 ENCOUNTER FOR ANNUAL ROUTINE GYNECOLOGICAL EXAMINATION: Primary | ICD-10-CM

## 2023-04-25 PROCEDURE — 99395 PREV VISIT EST AGE 18-39: CPT | Performed by: CLINICAL NURSE SPECIALIST

## 2023-04-25 RX ORDER — NORGESTIMATE AND ETHINYL ESTRADIOL 0.25-0.035
1 KIT ORAL DAILY
Qty: 84 TABLET | Refills: 3 | Status: SHIPPED | OUTPATIENT
Start: 2023-04-25

## 2023-04-25 NOTE — PROGRESS NOTES
[facility] AND GYNECOLOGY  [address] [place] [address] [address].  Archie Alex [address] [place] [id number]  Dept: [phone]        DATE OF VISIT:  23        History and Physical    Deidra Lemus    :  2000  CHIEF COMPLAINT:    Chief Complaint   Patient presents with    Annual Exam                    Deidra eLmus is a 24 y.o. female who presents for annual well woman exam    The patient was seen and examined. Per the patient bowels are regular. She has no voiding complaints. She denies any bloating as well as vaginal discharge. Chaperone for Intimate Exam  Chaperone was offered as part of the rooming process. Patient declined and agrees to continue with exam without a chaperone. Chaperone: none     _____________________________________________________________________  History reviewed. No pertinent past medical history. History reviewed. No pertinent surgical history. Family History   Problem Relation Age of Onset    Hypertension Paternal Grandmother     Diabetes Maternal Grandfather     Lupus Father     Breast Cancer Maternal Grandmother 61    Colon Cancer Neg Hx     Ovarian Cancer Neg Hx      Social History     Tobacco Use   Smoking Status Never   Smokeless Tobacco Never     Social History     Substance and Sexual Activity   Alcohol Use Yes    Comment: socially      Current Outpatient Medications   Medication Sig Dispense Refill    norgestimate-ethinyl estradiol (MONO-LINYAH) 0.25-35 MG-MCG per tablet Take 1 tablet by mouth daily Take 1 tab by mouth daily, skip placebo week and start new pack for continuous cycle 84 tablet 0     No current facility-administered medications for this visit. Allergies:  No Known Allergies    Gynecologic History:  Patient's last menstrual period was 2023.   Sexually Active: Yes  STD History:Yes chlamydia  Birth Control: Yes pills    OB

## 2023-05-17 DIAGNOSIS — Z11.51 SCREENING FOR HUMAN PAPILLOMAVIRUS: ICD-10-CM

## 2024-02-04 DIAGNOSIS — N94.6 DYSMENORRHEA: ICD-10-CM

## 2024-02-04 DIAGNOSIS — Z30.41 ENCOUNTER FOR SURVEILLANCE OF CONTRACEPTIVE PILLS: ICD-10-CM

## 2024-02-19 ENCOUNTER — NURSE ONLY (OUTPATIENT)
Dept: OBGYN CLINIC | Age: 24
End: 2024-02-19

## 2024-02-19 VITALS
SYSTOLIC BLOOD PRESSURE: 118 MMHG | BODY MASS INDEX: 44.32 KG/M2 | WEIGHT: 266 LBS | HEIGHT: 65 IN | DIASTOLIC BLOOD PRESSURE: 86 MMHG

## 2024-02-19 DIAGNOSIS — N93.9 VAGINAL SPOTTING: Primary | ICD-10-CM

## 2024-02-19 NOTE — PROGRESS NOTES
[facility] OBSTETRICS AND GYNECOLOGY  [address]   [address]  [facility] [address]  Dept: [phone]   Patient Name: Mariano Ahn  Patient : 2000  MRN #: [id number]  CSN #: [id number]    [place] CNM Self Swab Nurse Visit    Visit date:  2024     Mariano Ahn is a 23 y.o. female  here for a nurse self-swab visit.     Patient's last menstrual period was 2024.       Last visit with provider: 2023     Symptoms patient is experiencing: vaginal spotting , had concerns for std.   Her symptoms started few days ago     Testing she desires:    Vaginitis Yes    Gc/ct Yes    Urine pregnancy test No    Urine culture No     Any OTC treatments already tried? No      Pt was offered an appointment with a provider and patient is waiting for results     Gc/ct urine and vaginitis DNA         [facility] was [facility] Pt will be contacted for abnormal test results.    Results may Take 24-48 hours to result.    Chart sent to provider for review.    Bonny Jimenez MA

## 2024-02-22 NOTE — PROGRESS NOTES
Patient was notified that her test will be sent to [facility] for testing due to insurance and the time frame will be longer to be reviewed. Pt was offered if she would like to come in and do another swab that is for her insurance that we can send out. Patient voiced understanding.

## 2024-05-03 ENCOUNTER — OFFICE VISIT (OUTPATIENT)
Dept: OBGYN CLINIC | Age: 24
End: 2024-05-03
Payer: COMMERCIAL

## 2024-05-03 VITALS
BODY MASS INDEX: 43.49 KG/M2 | DIASTOLIC BLOOD PRESSURE: 72 MMHG | HEIGHT: 65 IN | WEIGHT: 261 LBS | SYSTOLIC BLOOD PRESSURE: 122 MMHG

## 2024-05-03 DIAGNOSIS — Z20.2 POTENTIAL EXPOSURE TO STD: ICD-10-CM

## 2024-05-03 DIAGNOSIS — N89.8 VAGINAL DISCHARGE: Primary | ICD-10-CM

## 2024-05-03 PROCEDURE — 99213 OFFICE O/P EST LOW 20 MIN: CPT

## 2024-05-03 SDOH — ECONOMIC STABILITY: FOOD INSECURITY: WITHIN THE PAST 12 MONTHS, YOU WORRIED THAT YOUR FOOD WOULD RUN OUT BEFORE YOU GOT MONEY TO BUY MORE.: NEVER TRUE

## 2024-05-03 SDOH — ECONOMIC STABILITY: INCOME INSECURITY: HOW HARD IS IT FOR YOU TO PAY FOR THE VERY BASICS LIKE FOOD, HOUSING, MEDICAL CARE, AND HEATING?: SOMEWHAT HARD

## 2024-05-03 SDOH — ECONOMIC STABILITY: HOUSING INSECURITY
IN THE LAST 12 MONTHS, WAS THERE A TIME WHEN YOU DID NOT HAVE A STEADY PLACE TO SLEEP OR SLEPT IN A SHELTER (INCLUDING NOW)?: NO

## 2024-05-03 SDOH — ECONOMIC STABILITY: FOOD INSECURITY: WITHIN THE PAST 12 MONTHS, THE FOOD YOU BOUGHT JUST DIDN'T LAST AND YOU DIDN'T HAVE MONEY TO GET MORE.: NEVER TRUE

## 2024-05-03 ASSESSMENT — PATIENT HEALTH QUESTIONNAIRE - PHQ9
SUM OF ALL RESPONSES TO PHQ QUESTIONS 1-9: 0
SUM OF ALL RESPONSES TO PHQ QUESTIONS 1-9: 0
2. FEELING DOWN, DEPRESSED OR HOPELESS: NOT AT ALL
1. LITTLE INTEREST OR PLEASURE IN DOING THINGS: NOT AT ALL
SUM OF ALL RESPONSES TO PHQ9 QUESTIONS 1 & 2: 0
SUM OF ALL RESPONSES TO PHQ QUESTIONS 1-9: 0
2. FEELING DOWN, DEPRESSED OR HOPELESS: NOT AT ALL
1. LITTLE INTEREST OR PLEASURE IN DOING THINGS: NOT AT ALL
SUM OF ALL RESPONSES TO PHQ QUESTIONS 1-9: 0
SUM OF ALL RESPONSES TO PHQ9 QUESTIONS 1 & 2: 0

## 2024-05-03 NOTE — PROGRESS NOTES
Subjective:  Chief Complaint   Patient presents with    vaginal irritation     HPI:  Mariano Ahn is a 24 y.o. female who arrives to office as an established patient.    Mariano reports over the last couple of weeks she's had vaginal discharge and discomfort. She is requesting swabs and blood work to check for yeast/BV, STDs.     Review of Systems   Genitourinary:  Positive for vaginal discharge.   All other systems reviewed and are negative.      Objective:  Vitals:    05/03/24 0732   BP: 122/72   Weight: 118.4 kg (261 lb)   Height: 1.651 m (5' 5\")      Body mass index is 43.43 kg/m².  No LMP recorded.  Current Outpatient Medications on File Prior to Visit   Medication Sig Dispense Refill    SPRINTEC 28 0.25-35 MG-MCG per tablet TAKE ONE TABLET BY MOUTH DAILY SKIP PLACEBO WEEK AND START NEW PACK FOR CONTINUOUS CYCLE 84 tablet 3     No current facility-administered medications on file prior to visit.      History reviewed. No pertinent past medical history.    Last PAP was normal; May/2023.   Next pap in 2026    Physical Exam  Vitals reviewed.   Constitutional:       Appearance: Normal appearance.   HENT:      Nose: Nose normal.   Cardiovascular:      Rate and Rhythm: Normal rate and regular rhythm.      Pulses: Normal pulses.   Pulmonary:      Effort: Pulmonary effort is normal.   Abdominal:      General: Abdomen is flat.      Palpations: Abdomen is soft.   Genitourinary:     General: Normal vulva.      Vagina: Vaginal discharge present.      Cervix: Normal.      Rectum: Normal.   Musculoskeletal:         General: Normal range of motion.      Cervical back: Normal range of motion.   Skin:     General: Skin is warm and dry.      Capillary Refill: Capillary refill takes less than 2 seconds.   Neurological:      Mental Status: She is alert and oriented to person, place, and time.   Psychiatric:         Mood and Affect: Mood normal.         Behavior: Behavior normal.         Thought Content: Thought content normal.

## 2024-05-08 DIAGNOSIS — N76.0 BACTERIAL VAGINOSIS: Primary | ICD-10-CM

## 2024-05-08 DIAGNOSIS — B96.89 BACTERIAL VAGINOSIS: Primary | ICD-10-CM

## 2024-05-08 RX ORDER — MOXIFLOXACIN HYDROCHLORIDE 400 MG/1
400 TABLET ORAL DAILY
Qty: 10 TABLET | Refills: 0 | Status: SHIPPED | OUTPATIENT
Start: 2024-05-08 | End: 2024-05-18

## 2024-12-31 ENCOUNTER — OFFICE VISIT (OUTPATIENT)
Dept: OBGYN CLINIC | Age: 24
End: 2024-12-31
Payer: COMMERCIAL

## 2024-12-31 ENCOUNTER — HOSPITAL ENCOUNTER (OUTPATIENT)
Age: 24
Setting detail: SPECIMEN
Discharge: HOME | End: 2024-12-31

## 2024-12-31 VITALS
DIASTOLIC BLOOD PRESSURE: 74 MMHG | WEIGHT: 269.3 LBS | BODY MASS INDEX: 43.28 KG/M2 | HEIGHT: 66 IN | SYSTOLIC BLOOD PRESSURE: 116 MMHG

## 2024-12-31 DIAGNOSIS — Z30.41 ENCOUNTER FOR SURVEILLANCE OF CONTRACEPTIVE PILLS: ICD-10-CM

## 2024-12-31 DIAGNOSIS — Z00.00 WELL WOMAN EXAM WITHOUT GYNECOLOGICAL EXAM: Primary | ICD-10-CM

## 2024-12-31 DIAGNOSIS — Z00.00 WELL WOMAN EXAM WITHOUT GYNECOLOGICAL EXAM: ICD-10-CM

## 2024-12-31 DIAGNOSIS — N94.6 DYSMENORRHEA: ICD-10-CM

## 2024-12-31 LAB
HCV AB SERPL QL IA: NONREACTIVE
HIV 1+2 AB+HIV1 P24 AG SERPL QL IA: NONREACTIVE
T PALLIDUM AB SER QL IA: NONREACTIVE

## 2024-12-31 PROCEDURE — 99395 PREV VISIT EST AGE 18-39: CPT

## 2024-12-31 RX ORDER — FEXOFENADINE HCL AND PSEUDOEPHEDRINE HCL 180; 240 MG/1; MG/1
1 TABLET, EXTENDED RELEASE ORAL DAILY
COMMUNITY
Start: 2024-03-22

## 2024-12-31 RX ORDER — NORGESTIMATE AND ETHINYL ESTRADIOL 0.25-0.035
1 KIT ORAL DAILY
Qty: 28 TABLET | Refills: 11 | Status: SHIPPED | OUTPATIENT
Start: 2024-12-31

## 2024-12-31 SDOH — ECONOMIC STABILITY: FOOD INSECURITY: WITHIN THE PAST 12 MONTHS, YOU WORRIED THAT YOUR FOOD WOULD RUN OUT BEFORE YOU GOT MONEY TO BUY MORE.: NEVER TRUE

## 2024-12-31 SDOH — ECONOMIC STABILITY: FOOD INSECURITY: WITHIN THE PAST 12 MONTHS, THE FOOD YOU BOUGHT JUST DIDN'T LAST AND YOU DIDN'T HAVE MONEY TO GET MORE.: NEVER TRUE

## 2024-12-31 SDOH — ECONOMIC STABILITY: INCOME INSECURITY: HOW HARD IS IT FOR YOU TO PAY FOR THE VERY BASICS LIKE FOOD, HOUSING, MEDICAL CARE, AND HEATING?: NOT HARD AT ALL

## 2024-12-31 ASSESSMENT — PATIENT HEALTH QUESTIONNAIRE - PHQ9
1. LITTLE INTEREST OR PLEASURE IN DOING THINGS: NOT AT ALL
SUM OF ALL RESPONSES TO PHQ QUESTIONS 1-9: 0
2. FEELING DOWN, DEPRESSED OR HOPELESS: NOT AT ALL
SUM OF ALL RESPONSES TO PHQ QUESTIONS 1-9: 0
SUM OF ALL RESPONSES TO PHQ9 QUESTIONS 1 & 2: 0

## 2024-12-31 ASSESSMENT — ANXIETY QUESTIONNAIRES
GAD7 TOTAL SCORE: 0
5. BEING SO RESTLESS THAT IT IS HARD TO SIT STILL: NOT AT ALL
4. TROUBLE RELAXING: NOT AT ALL
6. BECOMING EASILY ANNOYED OR IRRITABLE: NOT AT ALL
2. NOT BEING ABLE TO STOP OR CONTROL WORRYING: NOT AT ALL
7. FEELING AFRAID AS IF SOMETHING AWFUL MIGHT HAPPEN: NOT AT ALL
3. WORRYING TOO MUCH ABOUT DIFFERENT THINGS: NOT AT ALL
1. FEELING NERVOUS, ANXIOUS, OR ON EDGE: NOT AT ALL

## 2024-12-31 NOTE — PROGRESS NOTES
[facility] OBSTETRICS AND GYNECOLOGY  [address]   [address]  [facility] [address]  Dept: [phone]    Patient Name: Mariano Ahn  Patient Age: 24 y.o.  Date of Visit: 12/31/2024    Subjective  Chief Complaint   Patient presents with    Annual Exam     HPI:  Mariano Ahn is a 24 y.o. female who arrives to office as an established patient for annual exam.     Patient denies concerns today.   Patient reports is  sexually active with 1 male partner(s).   Patient denies pain with sex.  Patient has a history of sexually transmitted infection(s). Hx of Chlamydia.   Patient does want screening for sexually transmitted infection(s).  Reports is  on contraception     Review of Systems   All other systems reviewed and are negative.      Preventive Health Screening:   Date of last pap: normal 2023  History of abnormal pap: denies          Date of last mammogram: N/A   Date of last DEXA scan: N/A   Date of last colonoscopy (start age 45): N/A    History of Gestational Diabetes: No     If Yes, Glucose screening ordered: N/A    Preventive screening: Yes, with PCP    Family history of Breast, Ovarian, Colon or Uterine Cancer:  No      If Yes see scanned worksheet        12/31/2024    10:22 AM 5/3/2024     2:33 AM 2/5/2021     9:02 AM   PHQ Scores   PHQ2 Score 0 0 0   PHQ9 Score 0 0 0     Interpretation of Total Score Depression Severity: 1-4 = Minimal depression, 5-9 = Mild depression, 10-14 = Moderate depression, 15-19 = Moderately severe depression, 20-27 = Severe depression      12/31/2024    10:22 AM   STORMY 7 SCORE   STORMY-7 Total Score 0     Interpretation of STORMY-7 score: 5-9 = mild anxiety, 10-14 = moderate anxiety, 15+ = severe anxiety. Recommend referral to behavioral health for scores 10 or greater.     Social Determinants of Health:   Social History     Tobacco Use   Smoking Status Never   Smokeless Tobacco Never      Social History

## 2025-01-02 LAB
CHLAMYDIA DNA UR QL NAA+PROBE: NEGATIVE
N GONORRHOEA DNA UR QL NAA+PROBE: NEGATIVE
SPECIMEN DESCRIPTION: NORMAL

## 2025-01-27 DIAGNOSIS — N89.8 VAGINAL DISCHARGE: Primary | ICD-10-CM

## 2025-01-27 RX ORDER — FLUCONAZOLE 150 MG/1
150 TABLET ORAL
Qty: 2 TABLET | Refills: 0 | Status: SHIPPED | OUTPATIENT
Start: 2025-01-27 | End: 2025-02-02

## 2025-05-05 SDOH — ECONOMIC STABILITY: FOOD INSECURITY: WITHIN THE PAST 12 MONTHS, YOU WORRIED THAT YOUR FOOD WOULD RUN OUT BEFORE YOU GOT MONEY TO BUY MORE.: NEVER TRUE

## 2025-05-05 SDOH — ECONOMIC STABILITY: INCOME INSECURITY: IN THE LAST 12 MONTHS, WAS THERE A TIME WHEN YOU WERE NOT ABLE TO PAY THE MORTGAGE OR RENT ON TIME?: NO

## 2025-05-05 SDOH — ECONOMIC STABILITY: FOOD INSECURITY: WITHIN THE PAST 12 MONTHS, THE FOOD YOU BOUGHT JUST DIDN'T LAST AND YOU DIDN'T HAVE MONEY TO GET MORE.: NEVER TRUE

## 2025-05-05 ASSESSMENT — PATIENT HEALTH QUESTIONNAIRE - PHQ9
SUM OF ALL RESPONSES TO PHQ QUESTIONS 1-9: 2
SUM OF ALL RESPONSES TO PHQ9 QUESTIONS 1 & 2: 2
2. FEELING DOWN, DEPRESSED OR HOPELESS: SEVERAL DAYS
1. LITTLE INTEREST OR PLEASURE IN DOING THINGS: SEVERAL DAYS
SUM OF ALL RESPONSES TO PHQ QUESTIONS 1-9: 2
SUM OF ALL RESPONSES TO PHQ QUESTIONS 1-9: 2
2. FEELING DOWN, DEPRESSED OR HOPELESS: SEVERAL DAYS
1. LITTLE INTEREST OR PLEASURE IN DOING THINGS: SEVERAL DAYS
SUM OF ALL RESPONSES TO PHQ QUESTIONS 1-9: 2

## 2025-05-06 ENCOUNTER — OFFICE VISIT (OUTPATIENT)
Dept: OBGYN CLINIC | Age: 25
End: 2025-05-06
Payer: COMMERCIAL

## 2025-05-06 ENCOUNTER — HOSPITAL ENCOUNTER (OUTPATIENT)
Age: 25
Setting detail: SPECIMEN
Discharge: HOME | End: 2025-05-06

## 2025-05-06 VITALS
DIASTOLIC BLOOD PRESSURE: 82 MMHG | BODY MASS INDEX: 43.34 KG/M2 | HEIGHT: 66 IN | SYSTOLIC BLOOD PRESSURE: 128 MMHG | WEIGHT: 269.7 LBS

## 2025-05-06 DIAGNOSIS — Z11.3 ROUTINE SCREENING FOR STI (SEXUALLY TRANSMITTED INFECTION): ICD-10-CM

## 2025-05-06 DIAGNOSIS — L30.9 ACUTE ECZEMA: ICD-10-CM

## 2025-05-06 DIAGNOSIS — Z11.3 ROUTINE SCREENING FOR STI (SEXUALLY TRANSMITTED INFECTION): Primary | ICD-10-CM

## 2025-05-06 LAB
HBV SURFACE AB SERPL IA-ACNC: 142 MIU/ML
HCV AB SERPL QL IA: NONREACTIVE
HIV 1+2 AB+HIV1 P24 AG SERPL QL IA: NONREACTIVE
T PALLIDUM AB SER QL IA: NONREACTIVE

## 2025-05-06 PROCEDURE — 99214 OFFICE O/P EST MOD 30 MIN: CPT | Performed by: ADVANCED PRACTICE MIDWIFE

## 2025-05-06 RX ORDER — CLOTRIMAZOLE AND BETAMETHASONE DIPROPIONATE 10; .64 MG/G; MG/G
CREAM TOPICAL
Qty: 45 G | Refills: 0 | Status: SHIPPED | OUTPATIENT
Start: 2025-05-06

## 2025-05-06 NOTE — PROGRESS NOTES
Patient here for STD testing  Patient declines any discharge, itching states it is just time to be checked.  
Unknown (5/5/2025)    Housing Stability Vital Sign     Unable to Pay for Housing in the Last Year: Not on file     Number of Times Moved in the Last Year: Not on file     Homeless in the Last Year: No      No Known Allergies       Current Outpatient Medications   Medication Sig Dispense Refill    clotrimazole-betamethasone (LOTRISONE) 1-0.05 % cream Apply to affected area bid externally x 4 days then daily for 3 days 45 g 0    fexofenadine-pseudoephedrine (ALLEGRA-D 24HR) 180-240 MG per extended release tablet Take 1 tablet by mouth daily      norgestimate-ethinyl estradiol (SPRINTEC 28) 0.25-35 MG-MCG per tablet Take 1 tablet by mouth daily 28 tablet 11     No current facility-administered medications for this visit.       Review of Systems: all 10 other organ systems reviewed and are negative except if otherwise noted in HPI    O:  Vital Signs: /82   Ht 1.676 m (5' 6\")   Wt 122.3 kg (269 lb 11.2 oz)   LMP 04/22/2025   BMI 43.53 kg/m²   General: appears alert, oriented and cooperative.   Skin: Normal in appearance, texture, and temperature; No lesions    Genitourinary:     Bladder non- distended, non tender   External genitalia: mons pubis has skin thickening just above clitoral davenport bilateral sides. Scaling noted. No skin discoloration or lesions.   labia, clitoris, urethral orifice and introitus WNL  Vagina: pink vaginal walls and mucosa with normal rugae, no masses, tenderness or discharge  Cervix: pink, no masses, CMT, or discharge      A/P:    Visit Diagnoses         Codes      Routine screening for STI (sexually transmitted infection)    -  Primary Z11.3      Acute eczema     L30.9          Orders Placed This Encounter   Procedures    Vaginitis DNA Probe     Standing Status:   Future     Expected Date:   5/6/2025     Expiration Date:   5/5/2026    C.trachomatis N.gonorrhoeae DNA     Standing Status:   Future     Expected Date:   5/6/2025     Expiration Date:   5/5/2026    HIV Screen     Standing

## 2025-05-07 ENCOUNTER — RESULTS FOLLOW-UP (OUTPATIENT)
Dept: OBGYN CLINIC | Age: 25
End: 2025-05-07

## 2025-05-07 DIAGNOSIS — N76.0 BACTERIAL VAGINOSIS: Primary | ICD-10-CM

## 2025-05-07 DIAGNOSIS — B96.89 BACTERIAL VAGINOSIS: Primary | ICD-10-CM

## 2025-05-07 DIAGNOSIS — B16.9 ACUTE VIRAL HEPATITIS B WITHOUT COMA AND WITHOUT DELTA AGENT: ICD-10-CM

## 2025-05-07 LAB
C TRACH DNA SPEC QL PROBE+SIG AMP: NEGATIVE
CANDIDA SPECIES: NEGATIVE
GARDNERELLA VAGINALIS: POSITIVE
N GONORRHOEA DNA SPEC QL PROBE+SIG AMP: NEGATIVE
SOURCE: ABNORMAL
SPECIMEN DESCRIPTION: NORMAL
TRICHOMONAS: NEGATIVE

## 2025-05-07 RX ORDER — METRONIDAZOLE 7.5 MG/G
1 GEL VAGINAL DAILY
Qty: 1 EACH | Refills: 0 | Status: SHIPPED | OUTPATIENT
Start: 2025-05-07 | End: 2025-05-12

## 2025-05-07 NOTE — TELEPHONE ENCOUNTER
CNM spoke w/ pt about vaginitis results. Pt prefers vaginal gel for treatment. Rx sent to pharmacy.     CNM discussed positive Hepatitis B result.   Discussed sexually transmitted disease, will be contacted by the health department. Encouraged pt to inform her sexual partners from the last year & encouraged testing. Additional testing needs done for evaluation. PT instructed to go to lab to have drawn ASAP. After results, CNM will send in GI referral.

## 2025-05-09 ENCOUNTER — HOSPITAL ENCOUNTER (OUTPATIENT)
Age: 25
Setting detail: SPECIMEN
Discharge: HOME | End: 2025-05-09

## 2025-05-09 DIAGNOSIS — B16.9 ACUTE VIRAL HEPATITIS B WITHOUT COMA AND WITHOUT DELTA AGENT: ICD-10-CM

## 2025-05-09 LAB
ALBUMIN SERPL-MCNC: 4 G/DL (ref 3.5–5.2)
ALBUMIN/GLOB SERPL: 1.1 {RATIO} (ref 1–2.5)
ALP SERPL-CCNC: 100 U/L (ref 35–104)
ALT SERPL-CCNC: 16 U/L (ref 10–35)
ANION GAP SERPL CALCULATED.3IONS-SCNC: 10 MMOL/L (ref 9–16)
AST SERPL-CCNC: 16 U/L (ref 10–35)
BILIRUB SERPL-MCNC: 0.4 MG/DL (ref 0–1.2)
BUN SERPL-MCNC: 11 MG/DL (ref 6–20)
CALCIUM SERPL-MCNC: 9.3 MG/DL (ref 8.6–10.4)
CHLORIDE SERPL-SCNC: 104 MMOL/L (ref 98–107)
CO2 SERPL-SCNC: 26 MMOL/L (ref 20–31)
CREAT SERPL-MCNC: 0.7 MG/DL (ref 0.6–0.9)
GFR, ESTIMATED: >90 ML/MIN/1.73M2
GLUCOSE SERPL-MCNC: 80 MG/DL (ref 74–99)
POTASSIUM SERPL-SCNC: 4.1 MMOL/L (ref 3.7–5.3)
PROT SERPL-MCNC: 7.5 G/DL (ref 6.6–8.7)
SODIUM SERPL-SCNC: 140 MMOL/L (ref 136–145)

## 2025-05-11 LAB
HBV E AB SERPL QL IA: NEGATIVE
HBV E AG SERPL QL IA: NEGATIVE
HBV IU/ML: NOT DETECTED IU/ML
HBV LOG 10 IU/ML: NOT DETECTED LOG IU/ML
INTERPRETATION: NOT DETECTED

## 2025-05-13 ENCOUNTER — RESULTS FOLLOW-UP (OUTPATIENT)
Dept: OBGYN CLINIC | Age: 25
End: 2025-05-13

## 2025-09-04 ENCOUNTER — HOSPITAL ENCOUNTER (OUTPATIENT)
Age: 25
Setting detail: SPECIMEN
Discharge: HOME | End: 2025-09-04

## 2025-09-04 ENCOUNTER — OFFICE VISIT (OUTPATIENT)
Dept: OBGYN CLINIC | Age: 25
End: 2025-09-04
Payer: COMMERCIAL

## 2025-09-04 VITALS
BODY MASS INDEX: 27.13 KG/M2 | DIASTOLIC BLOOD PRESSURE: 76 MMHG | RESPIRATION RATE: 14 BRPM | SYSTOLIC BLOOD PRESSURE: 120 MMHG | HEIGHT: 66 IN | WEIGHT: 168.8 LBS

## 2025-09-04 DIAGNOSIS — N92.6 IRREGULAR MENSES: ICD-10-CM

## 2025-09-04 DIAGNOSIS — N94.6 DYSMENORRHEA: ICD-10-CM

## 2025-09-04 DIAGNOSIS — N92.6 IRREGULAR MENSES: Primary | ICD-10-CM

## 2025-09-04 LAB
B-HCG SERPL EIA 3RD IS-ACNC: <0.2 MIU/ML
HBV SURFACE AB SERPL IA-ACNC: 152 MIU/ML
HCV AB SERPL QL IA: NONREACTIVE
HIV 1+2 AB+HIV1 P24 AG SERPL QL IA: NONREACTIVE
T PALLIDUM AB SER QL IA: NONREACTIVE

## 2025-09-04 PROCEDURE — 99214 OFFICE O/P EST MOD 30 MIN: CPT

## 2025-09-04 RX ORDER — ACETAMINOPHEN 500 MG
1000 TABLET ORAL EVERY 8 HOURS
COMMUNITY
Start: 2025-03-07

## 2025-09-04 RX ORDER — NORETHINDRONE ACETATE AND ETHINYL ESTRADIOL 1.5-30(21)
1 KIT ORAL DAILY
Qty: 1 PACKET | Refills: 2 | Status: SHIPPED | OUTPATIENT
Start: 2025-09-04
